# Patient Record
Sex: MALE | Race: WHITE | Employment: OTHER | URBAN - METROPOLITAN AREA
[De-identification: names, ages, dates, MRNs, and addresses within clinical notes are randomized per-mention and may not be internally consistent; named-entity substitution may affect disease eponyms.]

---

## 2018-08-23 ENCOUNTER — HOSPITAL ENCOUNTER (OUTPATIENT)
Age: 83
Discharge: HOME | End: 2018-08-25
Payer: MEDICARE

## 2018-08-23 LAB
ALBUMIN SERPL-MCNC: 4 G/DL (ref 3.5–5.2)
ALP BLD-CCNC: 105 U/L (ref 40–129)
ALT SERPL-CCNC: 21 U/L (ref 0–40)
ANION GAP SERPL CALCULATED.3IONS-SCNC: 19 MMOL/L (ref 7–16)
AST SERPL-CCNC: 31 U/L (ref 0–39)
BASOPHILS ABSOLUTE: 0.04 E9/L (ref 0–0.2)
BASOPHILS RELATIVE PERCENT: 0.5 % (ref 0–2)
BILIRUB SERPL-MCNC: 0.5 MG/DL (ref 0–1.2)
BUN BLDV-MCNC: 18 MG/DL (ref 8–23)
CALCIUM SERPL-MCNC: 9.4 MG/DL (ref 8.6–10.2)
CHLORIDE BLD-SCNC: 99 MMOL/L (ref 98–107)
CHOLESTEROL, TOTAL: 177 MG/DL (ref 0–199)
CO2: 24 MMOL/L (ref 22–29)
CREAT SERPL-MCNC: 0.7 MG/DL (ref 0.7–1.2)
EOSINOPHILS ABSOLUTE: 0.14 E9/L (ref 0.05–0.5)
EOSINOPHILS RELATIVE PERCENT: 1.9 % (ref 0–6)
GFR AFRICAN AMERICAN: >60
GFR NON-AFRICAN AMERICAN: >60 ML/MIN/1.73
GLUCOSE BLD-MCNC: 94 MG/DL (ref 74–109)
HCT VFR BLD CALC: 46.1 % (ref 37–54)
HDLC SERPL-MCNC: 70 MG/DL
HEMOGLOBIN: 14.5 G/DL (ref 12.5–16.5)
IMMATURE GRANULOCYTES #: 0.02 E9/L
IMMATURE GRANULOCYTES %: 0.3 % (ref 0–5)
LDL CHOLESTEROL CALCULATED: 96 MG/DL (ref 0–99)
LYMPHOCYTES ABSOLUTE: 1.39 E9/L (ref 1.5–4)
LYMPHOCYTES RELATIVE PERCENT: 19.1 % (ref 20–42)
MCH RBC QN AUTO: 31.3 PG (ref 26–35)
MCHC RBC AUTO-ENTMCNC: 31.5 % (ref 32–34.5)
MCV RBC AUTO: 99.6 FL (ref 80–99.9)
MONOCYTES ABSOLUTE: 0.71 E9/L (ref 0.1–0.95)
MONOCYTES RELATIVE PERCENT: 9.8 % (ref 2–12)
NEUTROPHILS ABSOLUTE: 4.98 E9/L (ref 1.8–7.3)
NEUTROPHILS RELATIVE PERCENT: 68.4 % (ref 43–80)
PDW BLD-RTO: 13.7 FL (ref 11.5–15)
PLATELET # BLD: 227 E9/L (ref 130–450)
PMV BLD AUTO: 10.4 FL (ref 7–12)
POTASSIUM SERPL-SCNC: 4.3 MMOL/L (ref 3.5–5)
RBC # BLD: 4.63 E12/L (ref 3.8–5.8)
SODIUM BLD-SCNC: 142 MMOL/L (ref 132–146)
TOTAL PROTEIN: 8.1 G/DL (ref 6.4–8.3)
TRIGL SERPL-MCNC: 54 MG/DL (ref 0–149)
VLDLC SERPL CALC-MCNC: 11 MG/DL
WBC # BLD: 7.3 E9/L (ref 4.5–11.5)

## 2018-08-23 PROCEDURE — 80061 LIPID PANEL: CPT

## 2018-08-23 PROCEDURE — 80053 COMPREHEN METABOLIC PANEL: CPT

## 2018-08-23 PROCEDURE — 85025 COMPLETE CBC W/AUTO DIFF WBC: CPT

## 2020-01-01 ENCOUNTER — HOSPITAL ENCOUNTER (OUTPATIENT)
Dept: PREADMISSION TESTING | Age: 85
Discharge: HOME | DRG: 039 | End: 2020-10-20
Payer: MEDICARE

## 2020-01-01 ENCOUNTER — OFFICE VISIT (OUTPATIENT)
Dept: VASCULAR SURGERY | Age: 85
End: 2020-01-01
Payer: MEDICARE

## 2020-01-01 ENCOUNTER — ANESTHESIA EVENT (OUTPATIENT)
Dept: OPERATING ROOM | Age: 85
DRG: 039 | End: 2020-01-01
Payer: MEDICARE

## 2020-01-01 ENCOUNTER — ANESTHESIA (OUTPATIENT)
Dept: OPERATING ROOM | Age: 85
DRG: 039 | End: 2020-01-01
Payer: MEDICARE

## 2020-01-01 ENCOUNTER — HOSPITAL ENCOUNTER (OUTPATIENT)
Dept: INTERVENTIONAL RADIOLOGY/VASCULAR | Age: 85
Discharge: HOME | End: 2020-05-13
Payer: MEDICARE

## 2020-01-01 ENCOUNTER — HOSPITAL ENCOUNTER (OUTPATIENT)
Age: 85
Discharge: HOME | End: 2020-04-23
Payer: MEDICARE

## 2020-01-01 ENCOUNTER — TELEPHONE (OUTPATIENT)
Dept: VASCULAR SURGERY | Age: 85
End: 2020-01-01

## 2020-01-01 ENCOUNTER — HOSPITAL ENCOUNTER (INPATIENT)
Age: 85
LOS: 1 days | Discharge: HOME | DRG: 039 | End: 2020-10-23
Attending: SURGERY | Admitting: SURGERY
Payer: MEDICARE

## 2020-01-01 ENCOUNTER — HOSPITAL ENCOUNTER (OUTPATIENT)
Dept: CARDIOLOGY | Age: 85
Discharge: HOME | End: 2020-11-11
Payer: MEDICARE

## 2020-01-01 ENCOUNTER — HOSPITAL ENCOUNTER (OUTPATIENT)
Dept: NON INVASIVE DIAGNOSTICS | Age: 85
Discharge: HOME | End: 2020-09-18
Payer: MEDICARE

## 2020-01-01 ENCOUNTER — HOSPITAL ENCOUNTER (OUTPATIENT)
Dept: CT IMAGING | Age: 85
Discharge: HOME | End: 2020-08-11
Payer: MEDICARE

## 2020-01-01 ENCOUNTER — HOSPITAL ENCOUNTER (OUTPATIENT)
Dept: CT IMAGING | Age: 85
Discharge: HOME | End: 2020-05-13
Payer: MEDICARE

## 2020-01-01 ENCOUNTER — HOSPITAL ENCOUNTER (OUTPATIENT)
Age: 85
Discharge: HOME | End: 2020-10-18
Payer: MEDICARE

## 2020-01-01 ENCOUNTER — HOSPITAL ENCOUNTER (OUTPATIENT)
Dept: ULTRASOUND IMAGING | Age: 85
Discharge: HOME | End: 2020-04-24
Payer: MEDICARE

## 2020-01-01 ENCOUNTER — OFFICE VISIT (OUTPATIENT)
Dept: VASCULAR SURGERY | Age: 85
End: 2020-01-01

## 2020-01-01 ENCOUNTER — HOSPITAL ENCOUNTER (OUTPATIENT)
Dept: INTERVENTIONAL RADIOLOGY/VASCULAR | Age: 85
Discharge: HOME | End: 2020-05-15
Payer: MEDICARE

## 2020-01-01 ENCOUNTER — HOSPITAL ENCOUNTER (OUTPATIENT)
Age: 85
Discharge: HOME | End: 2020-08-06
Payer: MEDICARE

## 2020-01-01 VITALS
RESPIRATION RATE: 14 BRPM | OXYGEN SATURATION: 90 % | HEART RATE: 77 BPM | DIASTOLIC BLOOD PRESSURE: 51 MMHG | TEMPERATURE: 98.4 F | WEIGHT: 178 LBS | SYSTOLIC BLOOD PRESSURE: 114 MMHG | HEIGHT: 68 IN | BODY MASS INDEX: 26.98 KG/M2

## 2020-01-01 VITALS
OXYGEN SATURATION: 94 % | TEMPERATURE: 98.1 F | SYSTOLIC BLOOD PRESSURE: 117 MMHG | RESPIRATION RATE: 12 BRPM | HEIGHT: 68 IN | WEIGHT: 170 LBS | BODY MASS INDEX: 25.76 KG/M2 | DIASTOLIC BLOOD PRESSURE: 58 MMHG | HEART RATE: 75 BPM

## 2020-01-01 VITALS
BODY MASS INDEX: 25.76 KG/M2 | HEIGHT: 68 IN | SYSTOLIC BLOOD PRESSURE: 138 MMHG | RESPIRATION RATE: 16 BRPM | DIASTOLIC BLOOD PRESSURE: 74 MMHG | WEIGHT: 170 LBS

## 2020-01-01 VITALS
WEIGHT: 170 LBS | SYSTOLIC BLOOD PRESSURE: 130 MMHG | HEIGHT: 68 IN | RESPIRATION RATE: 16 BRPM | DIASTOLIC BLOOD PRESSURE: 70 MMHG | BODY MASS INDEX: 25.76 KG/M2

## 2020-01-01 VITALS
DIASTOLIC BLOOD PRESSURE: 78 MMHG | WEIGHT: 170 LBS | HEART RATE: 78 BPM | SYSTOLIC BLOOD PRESSURE: 124 MMHG | HEIGHT: 68 IN | RESPIRATION RATE: 16 BRPM | BODY MASS INDEX: 25.76 KG/M2

## 2020-01-01 VITALS
HEIGHT: 68 IN | BODY MASS INDEX: 25.76 KG/M2 | SYSTOLIC BLOOD PRESSURE: 118 MMHG | RESPIRATION RATE: 16 BRPM | WEIGHT: 170 LBS | HEART RATE: 76 BPM | DIASTOLIC BLOOD PRESSURE: 60 MMHG

## 2020-01-01 VITALS — OXYGEN SATURATION: 98 %

## 2020-01-01 LAB
ABO/RH: NORMAL
ALBUMIN SERPL-MCNC: 4 G/DL (ref 3.5–5.2)
ALP BLD-CCNC: 106 U/L (ref 40–129)
ALT SERPL-CCNC: 16 U/L (ref 0–40)
ANION GAP SERPL CALCULATED.3IONS-SCNC: 10 MMOL/L (ref 7–16)
ANION GAP SERPL CALCULATED.3IONS-SCNC: 11 MMOL/L (ref 7–16)
ANION GAP SERPL CALCULATED.3IONS-SCNC: 11 MMOL/L (ref 7–16)
ANION GAP SERPL CALCULATED.3IONS-SCNC: 8 MMOL/L (ref 7–16)
ANTIBODY SCREEN: NORMAL
AST SERPL-CCNC: 22 U/L (ref 0–39)
BACTERIA: ABNORMAL /HPF
BASOPHILS ABSOLUTE: 0 E9/L (ref 0–0.2)
BASOPHILS ABSOLUTE: 0.02 E9/L (ref 0–0.2)
BASOPHILS ABSOLUTE: 0.05 E9/L (ref 0–0.2)
BASOPHILS RELATIVE PERCENT: 0.2 % (ref 0–2)
BASOPHILS RELATIVE PERCENT: 0.3 % (ref 0–2)
BASOPHILS RELATIVE PERCENT: 0.8 % (ref 0–2)
BILIRUB SERPL-MCNC: 0.5 MG/DL (ref 0–1.2)
BILIRUBIN URINE: ABNORMAL
BLOOD, URINE: ABNORMAL
BUN BLDV-MCNC: 17 MG/DL (ref 8–23)
BUN BLDV-MCNC: 21 MG/DL (ref 8–23)
BUN BLDV-MCNC: 22 MG/DL (ref 8–23)
BUN BLDV-MCNC: 25 MG/DL (ref 8–23)
CALCIUM SERPL-MCNC: 10.1 MG/DL (ref 8.6–10.2)
CALCIUM SERPL-MCNC: 8.3 MG/DL (ref 8.6–10.2)
CALCIUM SERPL-MCNC: 9.7 MG/DL (ref 8.6–10.2)
CALCIUM SERPL-MCNC: 9.8 MG/DL (ref 8.6–10.2)
CHLORIDE BLD-SCNC: 102 MMOL/L (ref 98–107)
CHLORIDE BLD-SCNC: 95 MMOL/L (ref 98–107)
CHLORIDE BLD-SCNC: 96 MMOL/L (ref 98–107)
CHLORIDE BLD-SCNC: 97 MMOL/L (ref 98–107)
CHOLESTEROL, TOTAL: 196 MG/DL (ref 0–199)
CLARITY: CLEAR
CO2: 24 MMOL/L (ref 22–29)
CO2: 27 MMOL/L (ref 22–29)
CO2: 29 MMOL/L (ref 22–29)
CO2: 31 MMOL/L (ref 22–29)
COLOR: YELLOW
CREAT SERPL-MCNC: 0.6 MG/DL (ref 0.7–1.2)
CREAT SERPL-MCNC: 0.8 MG/DL (ref 0.7–1.2)
CREAT SERPL-MCNC: 0.8 MG/DL (ref 0.7–1.2)
CREAT SERPL-MCNC: 1 MG/DL (ref 0.7–1.2)
CRYSTALS, UA: ABNORMAL /HPF
EOSINOPHILS ABSOLUTE: 0 E9/L (ref 0.05–0.5)
EOSINOPHILS ABSOLUTE: 0.01 E9/L (ref 0.05–0.5)
EOSINOPHILS ABSOLUTE: 0.14 E9/L (ref 0.05–0.5)
EOSINOPHILS RELATIVE PERCENT: 0.1 % (ref 0–6)
EOSINOPHILS RELATIVE PERCENT: 1.1 % (ref 0–6)
EOSINOPHILS RELATIVE PERCENT: 2.1 % (ref 0–6)
EPITHELIAL CELLS, UA: ABNORMAL /HPF
GFR AFRICAN AMERICAN: >60
GFR NON-AFRICAN AMERICAN: >60 ML/MIN/1.73
GLUCOSE BLD-MCNC: 105 MG/DL (ref 74–99)
GLUCOSE BLD-MCNC: 113 MG/DL (ref 74–99)
GLUCOSE BLD-MCNC: 93 MG/DL (ref 74–99)
GLUCOSE BLD-MCNC: 99 MG/DL (ref 74–99)
GLUCOSE URINE: NEGATIVE MG/DL
HCT VFR BLD CALC: 35.2 % (ref 37–54)
HCT VFR BLD CALC: 35.2 % (ref 37–54)
HCT VFR BLD CALC: 40.4 % (ref 37–54)
HCT VFR BLD CALC: 45.5 % (ref 37–54)
HDLC SERPL-MCNC: 75 MG/DL
HEMOGLOBIN: 11.3 G/DL (ref 12.5–16.5)
HEMOGLOBIN: 11.4 G/DL (ref 12.5–16.5)
HEMOGLOBIN: 12.8 G/DL (ref 12.5–16.5)
HEMOGLOBIN: 14 G/DL (ref 12.5–16.5)
HYALINE CASTS: ABNORMAL /LPF (ref 0–2)
IMMATURE GRANULOCYTES #: 0.02 E9/L
IMMATURE GRANULOCYTES #: 0.03 E9/L
IMMATURE GRANULOCYTES %: 0.3 % (ref 0–5)
IMMATURE GRANULOCYTES %: 0.4 % (ref 0–5)
INR BLD: 1.1
KETONES, URINE: ABNORMAL MG/DL
LDL CHOLESTEROL CALCULATED: 103 MG/DL (ref 0–99)
LEUKOCYTE ESTERASE, URINE: NEGATIVE
LV EF: 51 %
LVEF MODALITY: NORMAL
LYMPHOCYTES ABSOLUTE: 0.75 E9/L (ref 1.5–4)
LYMPHOCYTES ABSOLUTE: 1 E9/L (ref 1.5–4)
LYMPHOCYTES ABSOLUTE: 1.26 E9/L (ref 1.5–4)
LYMPHOCYTES RELATIVE PERCENT: 12.3 % (ref 20–42)
LYMPHOCYTES RELATIVE PERCENT: 19 % (ref 20–42)
LYMPHOCYTES RELATIVE PERCENT: 9.6 % (ref 20–42)
MCH RBC QN AUTO: 30.2 PG (ref 26–35)
MCH RBC QN AUTO: 31 PG (ref 26–35)
MCH RBC QN AUTO: 31.3 PG (ref 26–35)
MCH RBC QN AUTO: 31.7 PG (ref 26–35)
MCHC RBC AUTO-ENTMCNC: 30.8 % (ref 32–34.5)
MCHC RBC AUTO-ENTMCNC: 31.7 % (ref 32–34.5)
MCHC RBC AUTO-ENTMCNC: 32.1 % (ref 32–34.5)
MCHC RBC AUTO-ENTMCNC: 32.4 % (ref 32–34.5)
MCV RBC AUTO: 96.7 FL (ref 80–99.9)
MCV RBC AUTO: 97.8 FL (ref 80–99.9)
MCV RBC AUTO: 98.3 FL (ref 80–99.9)
MCV RBC AUTO: 98.9 FL (ref 80–99.9)
MONOCYTES ABSOLUTE: 0 E9/L (ref 0.1–0.95)
MONOCYTES ABSOLUTE: 0.62 E9/L (ref 0.1–0.95)
MONOCYTES ABSOLUTE: 0.88 E9/L (ref 0.1–0.95)
MONOCYTES RELATIVE PERCENT: 10.9 % (ref 2–12)
MONOCYTES RELATIVE PERCENT: 2.4 % (ref 2–12)
MONOCYTES RELATIVE PERCENT: 9.4 % (ref 2–12)
MRSA CULTURE ONLY: NORMAL
NEUTROPHILS ABSOLUTE: 4.53 E9/L (ref 1.8–7.3)
NEUTROPHILS ABSOLUTE: 6.16 E9/L (ref 1.8–7.3)
NEUTROPHILS ABSOLUTE: 6.75 E9/L (ref 1.8–7.3)
NEUTROPHILS RELATIVE PERCENT: 68.4 % (ref 43–80)
NEUTROPHILS RELATIVE PERCENT: 76.1 % (ref 43–80)
NEUTROPHILS RELATIVE PERCENT: 90.4 % (ref 43–80)
NITRITE, URINE: POSITIVE
PDW BLD-RTO: 13.4 FL (ref 11.5–15)
PDW BLD-RTO: 13.7 FL (ref 11.5–15)
PDW BLD-RTO: 13.7 FL (ref 11.5–15)
PDW BLD-RTO: 14.5 FL (ref 11.5–15)
PH UA: 5.5 (ref 5–9)
PLATELET # BLD: 162 E9/L (ref 130–450)
PLATELET # BLD: 171 E9/L (ref 130–450)
PLATELET # BLD: 212 E9/L (ref 130–450)
PLATELET # BLD: 237 E9/L (ref 130–450)
PMV BLD AUTO: 10.2 FL (ref 7–12)
PMV BLD AUTO: 9.5 FL (ref 7–12)
PMV BLD AUTO: 9.5 FL (ref 7–12)
PMV BLD AUTO: 9.7 FL (ref 7–12)
POC ACT LR: 161 SECONDS
POC ACT LR: 167 SECONDS
POC ACT LR: 282 SECONDS
POC ACT LR: 294 SECONDS
POTASSIUM REFLEX MAGNESIUM: 4.3 MMOL/L (ref 3.5–5)
POTASSIUM REFLEX MAGNESIUM: 4.4 MMOL/L (ref 3.5–5)
POTASSIUM SERPL-SCNC: 3.8 MMOL/L (ref 3.5–5)
POTASSIUM SERPL-SCNC: 4.2 MMOL/L (ref 3.5–5)
PROTEIN UA: ABNORMAL MG/DL
PROTHROMBIN TIME: 12.8 SEC (ref 9.3–12.4)
RBC # BLD: 3.56 E12/L (ref 3.8–5.8)
RBC # BLD: 3.64 E12/L (ref 3.8–5.8)
RBC # BLD: 4.13 E12/L (ref 3.8–5.8)
RBC # BLD: 4.63 E12/L (ref 3.8–5.8)
RBC # BLD: NORMAL 10*6/UL
RBC UA: ABNORMAL /HPF (ref 0–2)
SARS-COV-2: NOT DETECTED
SODIUM BLD-SCNC: 133 MMOL/L (ref 132–146)
SODIUM BLD-SCNC: 134 MMOL/L (ref 132–146)
SODIUM BLD-SCNC: 137 MMOL/L (ref 132–146)
SODIUM BLD-SCNC: 137 MMOL/L (ref 132–146)
SOURCE: NORMAL
SPECIFIC GRAVITY UA: >=1.03 (ref 1–1.03)
TOTAL PROTEIN: 8.1 G/DL (ref 6.4–8.3)
TRIGL SERPL-MCNC: 88 MG/DL (ref 0–149)
TSH SERPL DL<=0.05 MIU/L-ACNC: 3.57 UIU/ML (ref 0.27–4.2)
UROBILINOGEN, URINE: 1 E.U./DL
VLDLC SERPL CALC-MCNC: 18 MG/DL
WBC # BLD: 6.6 E9/L (ref 4.5–11.5)
WBC # BLD: 7 E9/L (ref 4.5–11.5)
WBC # BLD: 7.5 E9/L (ref 4.5–11.5)
WBC # BLD: 8.1 E9/L (ref 4.5–11.5)
WBC UA: ABNORMAL /HPF (ref 0–5)

## 2020-01-01 PROCEDURE — 6360000002 HC RX W HCPCS: Performed by: SURGERY

## 2020-01-01 PROCEDURE — 6370000000 HC RX 637 (ALT 250 FOR IP): Performed by: NURSE PRACTITIONER

## 2020-01-01 PROCEDURE — 6360000002 HC RX W HCPCS: Performed by: ANESTHESIOLOGY

## 2020-01-01 PROCEDURE — 85610 PROTHROMBIN TIME: CPT

## 2020-01-01 PROCEDURE — U0003 INFECTIOUS AGENT DETECTION BY NUCLEIC ACID (DNA OR RNA); SEVERE ACUTE RESPIRATORY SYNDROME CORONAVIRUS 2 (SARS-COV-2) (CORONAVIRUS DISEASE [COVID-19]), AMPLIFIED PROBE TECHNIQUE, MAKING USE OF HIGH THROUGHPUT TECHNOLOGIES AS DESCRIBED BY CMS-2020-01-R: HCPCS

## 2020-01-01 PROCEDURE — 51702 INSERT TEMP BLADDER CATH: CPT

## 2020-01-01 PROCEDURE — 86850 RBC ANTIBODY SCREEN: CPT

## 2020-01-01 PROCEDURE — 36415 COLL VENOUS BLD VENIPUNCTURE: CPT

## 2020-01-01 PROCEDURE — 6360000004 HC RX CONTRAST MEDICATION: Performed by: RADIOLOGY

## 2020-01-01 PROCEDURE — 86901 BLOOD TYPING SEROLOGIC RH(D): CPT

## 2020-01-01 PROCEDURE — 93978 VASCULAR STUDY: CPT

## 2020-01-01 PROCEDURE — 93880 EXTRACRANIAL BILAT STUDY: CPT

## 2020-01-01 PROCEDURE — 80048 BASIC METABOLIC PNL TOTAL CA: CPT

## 2020-01-01 PROCEDURE — 3600000015 HC SURGERY LEVEL 5 ADDTL 15MIN: Performed by: SURGERY

## 2020-01-01 PROCEDURE — 87081 CULTURE SCREEN ONLY: CPT

## 2020-01-01 PROCEDURE — 2709999900 HC NON-CHARGEABLE SUPPLY: Performed by: SURGERY

## 2020-01-01 PROCEDURE — 3700000001 HC ADD 15 MINUTES (ANESTHESIA): Performed by: SURGERY

## 2020-01-01 PROCEDURE — 99204 OFFICE O/P NEW MOD 45 MIN: CPT | Performed by: SURGERY

## 2020-01-01 PROCEDURE — 03CL0ZZ EXTIRPATION OF MATTER FROM LEFT INTERNAL CAROTID ARTERY, OPEN APPROACH: ICD-10-PCS | Performed by: SURGERY

## 2020-01-01 PROCEDURE — 76775 US EXAM ABDO BACK WALL LIM: CPT

## 2020-01-01 PROCEDURE — 70498 CT ANGIOGRAPHY NECK: CPT

## 2020-01-01 PROCEDURE — 84443 ASSAY THYROID STIM HORMONE: CPT

## 2020-01-01 PROCEDURE — 03UJ0KZ SUPPLEMENT LEFT COMMON CAROTID ARTERY WITH NONAUTOLOGOUS TISSUE SUBSTITUTE, OPEN APPROACH: ICD-10-PCS | Performed by: SURGERY

## 2020-01-01 PROCEDURE — 7100000001 HC PACU RECOVERY - ADDTL 15 MIN: Performed by: SURGERY

## 2020-01-01 PROCEDURE — 74174 CTA ABD&PLVS W/CONTRAST: CPT

## 2020-01-01 PROCEDURE — 88304 TISSUE EXAM BY PATHOLOGIST: CPT

## 2020-01-01 PROCEDURE — 80053 COMPREHEN METABOLIC PANEL: CPT

## 2020-01-01 PROCEDURE — 2580000003 HC RX 258: Performed by: NURSE PRACTITIONER

## 2020-01-01 PROCEDURE — 93922 UPR/L XTREMITY ART 2 LEVELS: CPT

## 2020-01-01 PROCEDURE — 93306 TTE W/DOPPLER COMPLETE: CPT

## 2020-01-01 PROCEDURE — 85025 COMPLETE CBC W/AUTO DIFF WBC: CPT

## 2020-01-01 PROCEDURE — 3700000000 HC ANESTHESIA ATTENDED CARE: Performed by: SURGERY

## 2020-01-01 PROCEDURE — 85027 COMPLETE CBC AUTOMATED: CPT

## 2020-01-01 PROCEDURE — 3600000005 HC SURGERY LEVEL 5 BASE: Performed by: SURGERY

## 2020-01-01 PROCEDURE — 2000000000 HC ICU R&B

## 2020-01-01 PROCEDURE — C1768 GRAFT, VASCULAR: HCPCS | Performed by: SURGERY

## 2020-01-01 PROCEDURE — 81001 URINALYSIS AUTO W/SCOPE: CPT

## 2020-01-01 PROCEDURE — 7100000000 HC PACU RECOVERY - FIRST 15 MIN: Performed by: SURGERY

## 2020-01-01 PROCEDURE — 80061 LIPID PANEL: CPT

## 2020-01-01 PROCEDURE — 6360000002 HC RX W HCPCS: Performed by: NURSE PRACTITIONER

## 2020-01-01 PROCEDURE — 03UL0KZ SUPPLEMENT LEFT INTERNAL CAROTID ARTERY WITH NONAUTOLOGOUS TISSUE SUBSTITUTE, OPEN APPROACH: ICD-10-PCS | Performed by: SURGERY

## 2020-01-01 PROCEDURE — 6360000002 HC RX W HCPCS

## 2020-01-01 PROCEDURE — 2500000003 HC RX 250 WO HCPCS

## 2020-01-01 PROCEDURE — 2580000003 HC RX 258: Performed by: SURGERY

## 2020-01-01 PROCEDURE — 2500000003 HC RX 250 WO HCPCS: Performed by: SURGERY

## 2020-01-01 PROCEDURE — 86900 BLOOD TYPING SEROLOGIC ABO: CPT

## 2020-01-01 PROCEDURE — 6370000000 HC RX 637 (ALT 250 FOR IP): Performed by: SURGERY

## 2020-01-01 PROCEDURE — 03CJ0ZZ EXTIRPATION OF MATTER FROM LEFT COMMON CAROTID ARTERY, OPEN APPROACH: ICD-10-PCS | Performed by: SURGERY

## 2020-01-01 PROCEDURE — 88311 DECALCIFY TISSUE: CPT

## 2020-01-01 PROCEDURE — 99213 OFFICE O/P EST LOW 20 MIN: CPT | Performed by: SURGERY

## 2020-01-01 PROCEDURE — 35301 RECHANNELING OF ARTERY: CPT | Performed by: SURGERY

## 2020-01-01 PROCEDURE — 2580000003 HC RX 258

## 2020-01-01 PROCEDURE — 99024 POSTOP FOLLOW-UP VISIT: CPT | Performed by: SURGERY

## 2020-01-01 PROCEDURE — 85347 COAGULATION TIME ACTIVATED: CPT

## 2020-01-01 DEVICE — XENOSURE BIOLOGIC PATCH, 0.8CM X 8CM, EIFU
Type: IMPLANTABLE DEVICE | Site: CAROTID | Status: FUNCTIONAL
Brand: XENOSURE BIOLOGIC PATCH

## 2020-01-01 RX ORDER — OXYCODONE HYDROCHLORIDE AND ACETAMINOPHEN 5; 325 MG/1; MG/1
1 TABLET ORAL
Status: DISCONTINUED | OUTPATIENT
Start: 2020-01-01 | End: 2020-01-01

## 2020-01-01 RX ORDER — OXYCODONE HYDROCHLORIDE AND ACETAMINOPHEN 5; 325 MG/1; MG/1
2 TABLET ORAL EVERY 4 HOURS PRN
Status: DISCONTINUED | OUTPATIENT
Start: 2020-01-01 | End: 2020-01-01 | Stop reason: HOSPADM

## 2020-01-01 RX ORDER — LIDOCAINE HYDROCHLORIDE 20 MG/ML
INJECTION, SOLUTION INTRAVENOUS PRN
Status: DISCONTINUED | OUTPATIENT
Start: 2020-01-01 | End: 2020-01-01 | Stop reason: SDUPTHER

## 2020-01-01 RX ORDER — FENTANYL CITRATE 50 UG/ML
50 INJECTION, SOLUTION INTRAMUSCULAR; INTRAVENOUS EVERY 5 MIN PRN
Status: DISCONTINUED | OUTPATIENT
Start: 2020-01-01 | End: 2020-01-01

## 2020-01-01 RX ORDER — TAMSULOSIN HYDROCHLORIDE 0.4 MG/1
0.4 CAPSULE ORAL NIGHTLY
Status: DISCONTINUED | OUTPATIENT
Start: 2020-01-01 | End: 2020-01-01 | Stop reason: HOSPADM

## 2020-01-01 RX ORDER — CLOPIDOGREL BISULFATE 75 MG/1
75 TABLET ORAL DAILY
COMMUNITY

## 2020-01-01 RX ORDER — ASPIRIN 81 MG/1
81 TABLET ORAL DAILY
COMMUNITY

## 2020-01-01 RX ORDER — MEPERIDINE HYDROCHLORIDE 25 MG/ML
12.5 INJECTION INTRAMUSCULAR; INTRAVENOUS; SUBCUTANEOUS EVERY 5 MIN PRN
Status: DISCONTINUED | OUTPATIENT
Start: 2020-01-01 | End: 2020-01-01

## 2020-01-01 RX ORDER — LANOLIN ALCOHOL/MO/W.PET/CERES
50 CREAM (GRAM) TOPICAL DAILY
COMMUNITY

## 2020-01-01 RX ORDER — OXYCODONE HYDROCHLORIDE AND ACETAMINOPHEN 5; 325 MG/1; MG/1
1 TABLET ORAL EVERY 4 HOURS PRN
Status: DISCONTINUED | OUTPATIENT
Start: 2020-01-01 | End: 2020-01-01 | Stop reason: HOSPADM

## 2020-01-01 RX ORDER — SODIUM CHLORIDE 0.9 % (FLUSH) 0.9 %
10 SYRINGE (ML) INJECTION PRN
Status: DISCONTINUED | OUTPATIENT
Start: 2020-01-01 | End: 2020-01-01

## 2020-01-01 RX ORDER — METOPROLOL SUCCINATE 50 MG/1
50 TABLET, EXTENDED RELEASE ORAL DAILY
Status: DISCONTINUED | OUTPATIENT
Start: 2020-01-01 | End: 2020-01-01 | Stop reason: HOSPADM

## 2020-01-01 RX ORDER — METOPROLOL SUCCINATE 50 MG/1
50 TABLET, EXTENDED RELEASE ORAL DAILY
COMMUNITY

## 2020-01-01 RX ORDER — VECURONIUM BROMIDE 1 MG/ML
INJECTION, POWDER, LYOPHILIZED, FOR SOLUTION INTRAVENOUS PRN
Status: DISCONTINUED | OUTPATIENT
Start: 2020-01-01 | End: 2020-01-01 | Stop reason: SDUPTHER

## 2020-01-01 RX ORDER — MORPHINE SULFATE 2 MG/ML
2 INJECTION, SOLUTION INTRAMUSCULAR; INTRAVENOUS
Status: DISCONTINUED | OUTPATIENT
Start: 2020-01-01 | End: 2020-01-01 | Stop reason: HOSPADM

## 2020-01-01 RX ORDER — SODIUM CHLORIDE 9 MG/ML
INJECTION, SOLUTION INTRAVENOUS CONTINUOUS
Status: DISCONTINUED | OUTPATIENT
Start: 2020-01-01 | End: 2020-01-01 | Stop reason: HOSPADM

## 2020-01-01 RX ORDER — ACETAMINOPHEN 160 MG
2000 TABLET,DISINTEGRATING ORAL DAILY
COMMUNITY

## 2020-01-01 RX ORDER — HEPARIN SODIUM 1000 [USP'U]/ML
INJECTION, SOLUTION INTRAVENOUS; SUBCUTANEOUS PRN
Status: DISCONTINUED | OUTPATIENT
Start: 2020-01-01 | End: 2020-01-01 | Stop reason: SDUPTHER

## 2020-01-01 RX ORDER — ATORVASTATIN CALCIUM 20 MG/1
20 TABLET, FILM COATED ORAL DAILY
COMMUNITY

## 2020-01-01 RX ORDER — LISINOPRIL 5 MG/1
5 TABLET ORAL DAILY
COMMUNITY

## 2020-01-01 RX ORDER — ONDANSETRON 2 MG/ML
INJECTION INTRAMUSCULAR; INTRAVENOUS PRN
Status: DISCONTINUED | OUTPATIENT
Start: 2020-01-01 | End: 2020-01-01 | Stop reason: SDUPTHER

## 2020-01-01 RX ORDER — OXYBUTYNIN CHLORIDE 5 MG/1
5 TABLET, EXTENDED RELEASE ORAL DAILY
COMMUNITY

## 2020-01-01 RX ORDER — MORPHINE SULFATE 4 MG/ML
4 INJECTION, SOLUTION INTRAMUSCULAR; INTRAVENOUS
Status: DISCONTINUED | OUTPATIENT
Start: 2020-01-01 | End: 2020-01-01 | Stop reason: HOSPADM

## 2020-01-01 RX ORDER — ASPIRIN 81 MG/1
81 TABLET ORAL DAILY
Status: DISCONTINUED | OUTPATIENT
Start: 2020-01-01 | End: 2020-01-01 | Stop reason: HOSPADM

## 2020-01-01 RX ORDER — SODIUM CHLORIDE 0.9 % (FLUSH) 0.9 %
10 SYRINGE (ML) INJECTION PRN
Status: DISCONTINUED | OUTPATIENT
Start: 2020-01-01 | End: 2020-01-01 | Stop reason: HOSPADM

## 2020-01-01 RX ORDER — OXYCODONE HYDROCHLORIDE AND ACETAMINOPHEN 5; 325 MG/1; MG/1
1 TABLET ORAL EVERY 6 HOURS PRN
Qty: 20 TABLET | Refills: 0 | Status: SHIPPED | OUTPATIENT
Start: 2020-01-01 | End: 2020-01-01

## 2020-01-01 RX ORDER — SODIUM CHLORIDE 0.9 % (FLUSH) 0.9 %
10 SYRINGE (ML) INJECTION EVERY 12 HOURS SCHEDULED
Status: DISCONTINUED | OUTPATIENT
Start: 2020-01-01 | End: 2020-01-01 | Stop reason: HOSPADM

## 2020-01-01 RX ORDER — LIDOCAINE HYDROCHLORIDE ANHYDROUS AND DEXTROSE MONOHYDRATE .4; 5 G/100ML; G/100ML
INJECTION, SOLUTION INTRAVENOUS CONTINUOUS PRN
Status: DISCONTINUED | OUTPATIENT
Start: 2020-01-01 | End: 2020-01-01 | Stop reason: SDUPTHER

## 2020-01-01 RX ORDER — TIMOLOL MALEATE 5 MG/ML
1 SOLUTION/ DROPS OPHTHALMIC DAILY
Status: DISCONTINUED | OUTPATIENT
Start: 2020-01-01 | End: 2020-01-01 | Stop reason: HOSPADM

## 2020-01-01 RX ORDER — OXYBUTYNIN CHLORIDE 5 MG/1
5 TABLET, EXTENDED RELEASE ORAL DAILY
Status: DISCONTINUED | OUTPATIENT
Start: 2020-01-01 | End: 2020-01-01 | Stop reason: HOSPADM

## 2020-01-01 RX ORDER — SODIUM CHLORIDE 0.9 % (FLUSH) 0.9 %
10 SYRINGE (ML) INJECTION EVERY 12 HOURS SCHEDULED
Status: DISCONTINUED | OUTPATIENT
Start: 2020-01-01 | End: 2020-01-01

## 2020-01-01 RX ORDER — PROPOFOL 10 MG/ML
INJECTION, EMULSION INTRAVENOUS CONTINUOUS PRN
Status: DISCONTINUED | OUTPATIENT
Start: 2020-01-01 | End: 2020-01-01 | Stop reason: SDUPTHER

## 2020-01-01 RX ORDER — LISINOPRIL 2.5 MG/1
5 TABLET ORAL DAILY
COMMUNITY
End: 2020-01-01 | Stop reason: ALTCHOICE

## 2020-01-01 RX ORDER — DEXAMETHASONE SODIUM PHOSPHATE 10 MG/ML
INJECTION, SOLUTION INTRAMUSCULAR; INTRAVENOUS PRN
Status: DISCONTINUED | OUTPATIENT
Start: 2020-01-01 | End: 2020-01-01 | Stop reason: SDUPTHER

## 2020-01-01 RX ORDER — ACETAMINOPHEN 325 MG/1
650 TABLET ORAL EVERY 4 HOURS PRN
Status: DISCONTINUED | OUTPATIENT
Start: 2020-01-01 | End: 2020-01-01 | Stop reason: HOSPADM

## 2020-01-01 RX ORDER — ATORVASTATIN CALCIUM 20 MG/1
20 TABLET, FILM COATED ORAL DAILY
Status: DISCONTINUED | OUTPATIENT
Start: 2020-01-01 | End: 2020-01-01 | Stop reason: HOSPADM

## 2020-01-01 RX ORDER — FENTANYL CITRATE 50 UG/ML
INJECTION, SOLUTION INTRAMUSCULAR; INTRAVENOUS PRN
Status: DISCONTINUED | OUTPATIENT
Start: 2020-01-01 | End: 2020-01-01 | Stop reason: SDUPTHER

## 2020-01-01 RX ORDER — AMLODIPINE BESYLATE 5 MG/1
5 TABLET ORAL DAILY
COMMUNITY

## 2020-01-01 RX ORDER — DIPHENHYDRAMINE HYDROCHLORIDE 50 MG/ML
12.5 INJECTION INTRAMUSCULAR; INTRAVENOUS
Status: DISCONTINUED | OUTPATIENT
Start: 2020-01-01 | End: 2020-01-01

## 2020-01-01 RX ORDER — PROPOFOL 10 MG/ML
INJECTION, EMULSION INTRAVENOUS PRN
Status: DISCONTINUED | OUTPATIENT
Start: 2020-01-01 | End: 2020-01-01 | Stop reason: SDUPTHER

## 2020-01-01 RX ORDER — PHENYLEPHRINE HYDROCHLORIDE 10 MG/ML
INJECTION INTRAVENOUS PRN
Status: DISCONTINUED | OUTPATIENT
Start: 2020-01-01 | End: 2020-01-01 | Stop reason: SDUPTHER

## 2020-01-01 RX ORDER — GLYCOPYRROLATE 1 MG/5 ML
SYRINGE (ML) INTRAVENOUS PRN
Status: DISCONTINUED | OUTPATIENT
Start: 2020-01-01 | End: 2020-01-01 | Stop reason: SDUPTHER

## 2020-01-01 RX ORDER — LORAZEPAM 1 MG/1
1 TABLET ORAL NIGHTLY
Status: DISCONTINUED | OUTPATIENT
Start: 2020-01-01 | End: 2020-01-01 | Stop reason: HOSPADM

## 2020-01-01 RX ORDER — SODIUM CHLORIDE 9 MG/ML
INJECTION, SOLUTION INTRAVENOUS CONTINUOUS PRN
Status: DISCONTINUED | OUTPATIENT
Start: 2020-01-01 | End: 2020-01-01 | Stop reason: SDUPTHER

## 2020-01-01 RX ORDER — FENTANYL CITRATE 50 UG/ML
25 INJECTION, SOLUTION INTRAMUSCULAR; INTRAVENOUS EVERY 5 MIN PRN
Status: DISCONTINUED | OUTPATIENT
Start: 2020-01-01 | End: 2020-01-01

## 2020-01-01 RX ORDER — CLOPIDOGREL BISULFATE 75 MG/1
75 TABLET ORAL DAILY
Status: DISCONTINUED | OUTPATIENT
Start: 2020-01-01 | End: 2020-01-01 | Stop reason: HOSPADM

## 2020-01-01 RX ORDER — LISINOPRIL 5 MG/1
5 TABLET ORAL DAILY
Status: DISCONTINUED | OUTPATIENT
Start: 2020-01-01 | End: 2020-01-01 | Stop reason: HOSPADM

## 2020-01-01 RX ORDER — PROTAMINE SULFATE 10 MG/ML
INJECTION, SOLUTION INTRAVENOUS PRN
Status: DISCONTINUED | OUTPATIENT
Start: 2020-01-01 | End: 2020-01-01 | Stop reason: SDUPTHER

## 2020-01-01 RX ORDER — PROMETHAZINE HYDROCHLORIDE 25 MG/ML
6.25 INJECTION, SOLUTION INTRAMUSCULAR; INTRAVENOUS
Status: DISCONTINUED | OUTPATIENT
Start: 2020-01-01 | End: 2020-01-01

## 2020-01-01 RX ORDER — SODIUM CHLORIDE 9 MG/ML
INJECTION, SOLUTION INTRAVENOUS CONTINUOUS
Status: DISCONTINUED | OUTPATIENT
Start: 2020-01-01 | End: 2020-01-01

## 2020-01-01 RX ORDER — AMLODIPINE BESYLATE 5 MG/1
5 TABLET ORAL DAILY
Status: DISCONTINUED | OUTPATIENT
Start: 2020-01-01 | End: 2020-01-01 | Stop reason: HOSPADM

## 2020-01-01 RX ORDER — NEOSTIGMINE METHYLSULFATE 1 MG/ML
INJECTION, SOLUTION INTRAVENOUS PRN
Status: DISCONTINUED | OUTPATIENT
Start: 2020-01-01 | End: 2020-01-01 | Stop reason: SDUPTHER

## 2020-01-01 RX ORDER — LORAZEPAM 1 MG/1
1 TABLET ORAL NIGHTLY
COMMUNITY

## 2020-01-01 RX ORDER — SODIUM CHLORIDE 450 MG/100ML
INJECTION, SOLUTION INTRAVENOUS CONTINUOUS
Status: DISCONTINUED | OUTPATIENT
Start: 2020-01-01 | End: 2020-01-01

## 2020-01-01 RX ADMIN — VECURONIUM BROMIDE 7 MG: 10 INJECTION, POWDER, LYOPHILIZED, FOR SOLUTION INTRAVENOUS at 08:00

## 2020-01-01 RX ADMIN — PROPOFOL 40 MG: 10 INJECTION, EMULSION INTRAVENOUS at 09:22

## 2020-01-01 RX ADMIN — AMLODIPINE BESYLATE 5 MG: 5 TABLET ORAL at 10:04

## 2020-01-01 RX ADMIN — IOPAMIDOL 75 ML: 755 INJECTION, SOLUTION INTRAVENOUS at 08:25

## 2020-01-01 RX ADMIN — DEXAMETHASONE SODIUM PHOSPHATE 10 MG: 10 INJECTION, SOLUTION INTRAMUSCULAR; INTRAVENOUS at 08:08

## 2020-01-01 RX ADMIN — FENTANYL CITRATE 50 MCG: 50 INJECTION, SOLUTION INTRAMUSCULAR; INTRAVENOUS at 11:41

## 2020-01-01 RX ADMIN — TAMSULOSIN HYDROCHLORIDE 0.4 MG: 0.4 CAPSULE ORAL at 10:04

## 2020-01-01 RX ADMIN — OXYBUTYNIN CHLORIDE 5 MG: 5 TABLET, EXTENDED RELEASE ORAL at 12:02

## 2020-01-01 RX ADMIN — OXYCODONE AND ACETAMINOPHEN 1 TABLET: 5; 325 TABLET ORAL at 21:57

## 2020-01-01 RX ADMIN — LIDOCAINE HYDROCHLORIDE 40 MG: 20 INJECTION, SOLUTION INTRAVENOUS at 07:58

## 2020-01-01 RX ADMIN — ENOXAPARIN SODIUM 40 MG: 40 INJECTION SUBCUTANEOUS at 17:31

## 2020-01-01 RX ADMIN — Medication 2 G: at 07:55

## 2020-01-01 RX ADMIN — SODIUM CHLORIDE, PRESERVATIVE FREE 10 ML: 5 INJECTION INTRAVENOUS at 20:17

## 2020-01-01 RX ADMIN — ONDANSETRON HYDROCHLORIDE 4 MG: 2 INJECTION, SOLUTION INTRAMUSCULAR; INTRAVENOUS at 10:00

## 2020-01-01 RX ADMIN — LIDOCAINE HYDROCHLORIDE 40 MG: 20 INJECTION, SOLUTION INTRAVENOUS at 10:17

## 2020-01-01 RX ADMIN — LIDOCAINE HYDROCHLORIDE 2 MG/MIN: 4 INJECTION, SOLUTION INTRAVENOUS at 08:08

## 2020-01-01 RX ADMIN — FENTANYL CITRATE 50 MCG: 50 INJECTION, SOLUTION INTRAMUSCULAR; INTRAVENOUS at 11:13

## 2020-01-01 RX ADMIN — SODIUM CHLORIDE: 9 INJECTION, SOLUTION INTRAVENOUS at 08:00

## 2020-01-01 RX ADMIN — HEPARIN SODIUM 7000 UNITS: 1000 INJECTION INTRAVENOUS; SUBCUTANEOUS at 09:03

## 2020-01-01 RX ADMIN — PROPOFOL 20 MG: 10 INJECTION, EMULSION INTRAVENOUS at 10:17

## 2020-01-01 RX ADMIN — PROPOFOL 100 MCG/KG/MIN: 10 INJECTION, EMULSION INTRAVENOUS at 08:08

## 2020-01-01 RX ADMIN — PROPOFOL 130 MG: 10 INJECTION, EMULSION INTRAVENOUS at 07:58

## 2020-01-01 RX ADMIN — SODIUM CHLORIDE: 9 INJECTION, SOLUTION INTRAVENOUS at 06:05

## 2020-01-01 RX ADMIN — HEPARIN SODIUM 3000 UNITS: 1000 INJECTION INTRAVENOUS; SUBCUTANEOUS at 09:08

## 2020-01-01 RX ADMIN — METOPROLOL SUCCINATE 50 MG: 50 TABLET, EXTENDED RELEASE ORAL at 10:04

## 2020-01-01 RX ADMIN — PROTAMINE SULFATE 40 MG: 10 INJECTION, SOLUTION INTRAVENOUS at 09:47

## 2020-01-01 RX ADMIN — FENTANYL CITRATE 50 MCG: 50 INJECTION, SOLUTION INTRAMUSCULAR; INTRAVENOUS at 07:58

## 2020-01-01 RX ADMIN — PROPOFOL 30 MG: 10 INJECTION, EMULSION INTRAVENOUS at 09:44

## 2020-01-01 RX ADMIN — PHENYLEPHRINE HYDROCHLORIDE 100 MCG: 10 INJECTION INTRAVENOUS at 09:07

## 2020-01-01 RX ADMIN — PHENYLEPHRINE HYDROCHLORIDE 100 MCG: 10 INJECTION INTRAVENOUS at 08:14

## 2020-01-01 RX ADMIN — HYDROMORPHONE HYDROCHLORIDE 0.5 MG: 1 INJECTION, SOLUTION INTRAMUSCULAR; INTRAVENOUS; SUBCUTANEOUS at 13:31

## 2020-01-01 RX ADMIN — SODIUM CHLORIDE: 9 INJECTION, SOLUTION INTRAVENOUS at 06:56

## 2020-01-01 RX ADMIN — OXYCODONE AND ACETAMINOPHEN 1 TABLET: 5; 325 TABLET ORAL at 17:41

## 2020-01-01 RX ADMIN — Medication 0.6 MG: at 10:13

## 2020-01-01 RX ADMIN — SODIUM CHLORIDE: 9 INJECTION, SOLUTION INTRAVENOUS at 21:51

## 2020-01-01 RX ADMIN — LIDOCAINE HYDROCHLORIDE 20 MG: 20 INJECTION, SOLUTION INTRAVENOUS at 09:46

## 2020-01-01 RX ADMIN — FENTANYL CITRATE 50 MCG: 50 INJECTION, SOLUTION INTRAMUSCULAR; INTRAVENOUS at 08:45

## 2020-01-01 RX ADMIN — TIMOLOL MALEATE 1 DROP: 5 SOLUTION/ DROPS OPHTHALMIC at 06:47

## 2020-01-01 RX ADMIN — Medication 3 MG: at 10:13

## 2020-01-01 RX ADMIN — ASPIRIN 81 MG: 81 TABLET, COATED ORAL at 12:02

## 2020-01-01 RX ADMIN — IOPAMIDOL 75 ML: 755 INJECTION, SOLUTION INTRAVENOUS at 09:28

## 2020-01-01 RX ADMIN — CLOPIDOGREL BISULFATE 75 MG: 75 TABLET ORAL at 10:05

## 2020-01-01 ASSESSMENT — PULMONARY FUNCTION TESTS
PIF_VALUE: 19
PIF_VALUE: 18
PIF_VALUE: 19
PIF_VALUE: 20
PIF_VALUE: 19
PIF_VALUE: 1
PIF_VALUE: 21
PIF_VALUE: 20
PIF_VALUE: 3
PIF_VALUE: 1
PIF_VALUE: 20
PIF_VALUE: 18
PIF_VALUE: 20
PIF_VALUE: 18
PIF_VALUE: 21
PIF_VALUE: 20
PIF_VALUE: 21
PIF_VALUE: 20
PIF_VALUE: 19
PIF_VALUE: 18
PIF_VALUE: 1
PIF_VALUE: 21
PIF_VALUE: 18
PIF_VALUE: 21
PIF_VALUE: 19
PIF_VALUE: 21
PIF_VALUE: 20
PIF_VALUE: 21
PIF_VALUE: 21
PIF_VALUE: 0
PIF_VALUE: 21
PIF_VALUE: 18
PIF_VALUE: 18
PIF_VALUE: 4
PIF_VALUE: 19
PIF_VALUE: 20
PIF_VALUE: 14
PIF_VALUE: 21
PIF_VALUE: 20
PIF_VALUE: 19
PIF_VALUE: 20
PIF_VALUE: 18
PIF_VALUE: 21
PIF_VALUE: 20
PIF_VALUE: 19
PIF_VALUE: 33
PIF_VALUE: 18
PIF_VALUE: 20
PIF_VALUE: 0
PIF_VALUE: 21
PIF_VALUE: 20
PIF_VALUE: 18
PIF_VALUE: 21
PIF_VALUE: 20
PIF_VALUE: 18
PIF_VALUE: 19
PIF_VALUE: 14
PIF_VALUE: 18
PIF_VALUE: 19
PIF_VALUE: 20
PIF_VALUE: 19
PIF_VALUE: 21
PIF_VALUE: 19
PIF_VALUE: 19
PIF_VALUE: 21
PIF_VALUE: 18
PIF_VALUE: 4
PIF_VALUE: 14
PIF_VALUE: 2
PIF_VALUE: 21
PIF_VALUE: 20
PIF_VALUE: 20
PIF_VALUE: 21
PIF_VALUE: 18
PIF_VALUE: 21
PIF_VALUE: 21
PIF_VALUE: 18
PIF_VALUE: 18
PIF_VALUE: 19
PIF_VALUE: 19
PIF_VALUE: 14
PIF_VALUE: 21
PIF_VALUE: 2
PIF_VALUE: 0
PIF_VALUE: 1
PIF_VALUE: 19
PIF_VALUE: 20
PIF_VALUE: 18
PIF_VALUE: 14
PIF_VALUE: 20
PIF_VALUE: 21
PIF_VALUE: 20
PIF_VALUE: 21
PIF_VALUE: 20
PIF_VALUE: 21
PIF_VALUE: 20
PIF_VALUE: 18
PIF_VALUE: 18
PIF_VALUE: 20
PIF_VALUE: 19
PIF_VALUE: 21
PIF_VALUE: 32
PIF_VALUE: 20
PIF_VALUE: 19
PIF_VALUE: 19
PIF_VALUE: 20
PIF_VALUE: 18
PIF_VALUE: 20
PIF_VALUE: 14
PIF_VALUE: 20
PIF_VALUE: 21
PIF_VALUE: 20
PIF_VALUE: 25
PIF_VALUE: 21
PIF_VALUE: 21
PIF_VALUE: 5
PIF_VALUE: 20
PIF_VALUE: 2
PIF_VALUE: 18
PIF_VALUE: 21
PIF_VALUE: 20
PIF_VALUE: 18
PIF_VALUE: 28
PIF_VALUE: 1
PIF_VALUE: 19
PIF_VALUE: 20
PIF_VALUE: 1
PIF_VALUE: 0
PIF_VALUE: 21
PIF_VALUE: 28
PIF_VALUE: 20
PIF_VALUE: 21
PIF_VALUE: 18
PIF_VALUE: 20
PIF_VALUE: 12
PIF_VALUE: 21
PIF_VALUE: 19
PIF_VALUE: 19
PIF_VALUE: 20
PIF_VALUE: 18

## 2020-01-01 ASSESSMENT — PAIN SCALES - GENERAL
PAINLEVEL_OUTOF10: 10
PAINLEVEL_OUTOF10: 0
PAINLEVEL_OUTOF10: 7
PAINLEVEL_OUTOF10: 7
PAINLEVEL_OUTOF10: 2
PAINLEVEL_OUTOF10: 4
PAINLEVEL_OUTOF10: 4
PAINLEVEL_OUTOF10: 0
PAINLEVEL_OUTOF10: 5
PAINLEVEL_OUTOF10: 3
PAINLEVEL_OUTOF10: 4
PAINLEVEL_OUTOF10: 4
PAINLEVEL_OUTOF10: 2

## 2020-01-01 ASSESSMENT — PAIN DESCRIPTION - ORIENTATION
ORIENTATION: LEFT
ORIENTATION: RIGHT
ORIENTATION: LEFT

## 2020-01-01 ASSESSMENT — PAIN DESCRIPTION - LOCATION
LOCATION: NECK

## 2020-01-01 ASSESSMENT — PAIN DESCRIPTION - PAIN TYPE
TYPE: SURGICAL PAIN

## 2020-01-01 ASSESSMENT — PAIN DESCRIPTION - FREQUENCY
FREQUENCY: CONTINUOUS

## 2020-01-01 ASSESSMENT — PAIN DESCRIPTION - DESCRIPTORS
DESCRIPTORS: DISCOMFORT;SORE
DESCRIPTORS: DISCOMFORT;PATIENT UNABLE TO DESCRIBE
DESCRIPTORS: SORE;DISCOMFORT
DESCRIPTORS: DISCOMFORT;SORE
DESCRIPTORS: SORE;DISCOMFORT
DESCRIPTORS: SORE;ACHING
DESCRIPTORS: DISCOMFORT;PATIENT UNABLE TO DESCRIBE

## 2020-01-01 ASSESSMENT — PAIN DESCRIPTION - PROGRESSION
CLINICAL_PROGRESSION: NOT CHANGED

## 2020-01-01 ASSESSMENT — PAIN - FUNCTIONAL ASSESSMENT
PAIN_FUNCTIONAL_ASSESSMENT: ACTIVITIES ARE NOT PREVENTED
PAIN_FUNCTIONAL_ASSESSMENT: ACTIVITIES ARE NOT PREVENTED
PAIN_FUNCTIONAL_ASSESSMENT: 0-10
PAIN_FUNCTIONAL_ASSESSMENT: ACTIVITIES ARE NOT PREVENTED

## 2020-01-01 ASSESSMENT — PAIN DESCRIPTION - ONSET
ONSET: ON-GOING

## 2020-01-01 ASSESSMENT — LIFESTYLE VARIABLES: SMOKING_STATUS: 0

## 2020-01-02 ENCOUNTER — HOSPITAL ENCOUNTER (OUTPATIENT)
Age: 85
Discharge: HOME | End: 2020-01-04
Payer: MEDICARE

## 2020-01-02 LAB
ALBUMIN SERPL-MCNC: 4.2 G/DL (ref 3.5–5.2)
ALP BLD-CCNC: 112 U/L (ref 40–129)
ALT SERPL-CCNC: 18 U/L (ref 0–40)
ANION GAP SERPL CALCULATED.3IONS-SCNC: 13 MMOL/L (ref 7–16)
AST SERPL-CCNC: 27 U/L (ref 0–39)
BASOPHILS ABSOLUTE: 0.05 E9/L (ref 0–0.2)
BASOPHILS RELATIVE PERCENT: 0.7 % (ref 0–2)
BILIRUB SERPL-MCNC: 0.5 MG/DL (ref 0–1.2)
BUN BLDV-MCNC: 16 MG/DL (ref 8–23)
CALCIUM SERPL-MCNC: 9.7 MG/DL (ref 8.6–10.2)
CHLORIDE BLD-SCNC: 97 MMOL/L (ref 98–107)
CHOLESTEROL, TOTAL: 194 MG/DL (ref 0–199)
CO2: 27 MMOL/L (ref 22–29)
CREAT SERPL-MCNC: 0.7 MG/DL (ref 0.7–1.2)
EOSINOPHILS ABSOLUTE: 0.2 E9/L (ref 0.05–0.5)
EOSINOPHILS RELATIVE PERCENT: 2.8 % (ref 0–6)
GFR AFRICAN AMERICAN: >60
GFR NON-AFRICAN AMERICAN: >60 ML/MIN/1.73
GLUCOSE BLD-MCNC: 93 MG/DL (ref 74–99)
HCT VFR BLD CALC: 47.1 % (ref 37–54)
HDLC SERPL-MCNC: 78 MG/DL
HEMOGLOBIN: 14.2 G/DL (ref 12.5–16.5)
IMMATURE GRANULOCYTES #: 0.02 E9/L
IMMATURE GRANULOCYTES %: 0.3 % (ref 0–5)
LDL CHOLESTEROL CALCULATED: 104 MG/DL (ref 0–99)
LYMPHOCYTES ABSOLUTE: 1.3 E9/L (ref 1.5–4)
LYMPHOCYTES RELATIVE PERCENT: 18.3 % (ref 20–42)
MCH RBC QN AUTO: 30.4 PG (ref 26–35)
MCHC RBC AUTO-ENTMCNC: 30.1 % (ref 32–34.5)
MCV RBC AUTO: 100.9 FL (ref 80–99.9)
MONOCYTES ABSOLUTE: 0.55 E9/L (ref 0.1–0.95)
MONOCYTES RELATIVE PERCENT: 7.7 % (ref 2–12)
NEUTROPHILS ABSOLUTE: 5 E9/L (ref 1.8–7.3)
NEUTROPHILS RELATIVE PERCENT: 70.2 % (ref 43–80)
PDW BLD-RTO: 13.7 FL (ref 11.5–15)
PLATELET # BLD: 252 E9/L (ref 130–450)
PMV BLD AUTO: 9.6 FL (ref 7–12)
POTASSIUM SERPL-SCNC: 4.5 MMOL/L (ref 3.5–5)
RBC # BLD: 4.67 E12/L (ref 3.8–5.8)
SODIUM BLD-SCNC: 137 MMOL/L (ref 132–146)
TOTAL PROTEIN: 8 G/DL (ref 6.4–8.3)
TRIGL SERPL-MCNC: 61 MG/DL (ref 0–149)
TSH SERPL DL<=0.05 MIU/L-ACNC: 2.73 UIU/ML (ref 0.27–4.2)
VITAMIN D 25-HYDROXY: 42 NG/ML (ref 30–100)
VLDLC SERPL CALC-MCNC: 12 MG/DL
WBC # BLD: 7.1 E9/L (ref 4.5–11.5)

## 2020-01-02 PROCEDURE — 82306 VITAMIN D 25 HYDROXY: CPT

## 2020-01-02 PROCEDURE — 84443 ASSAY THYROID STIM HORMONE: CPT

## 2020-01-02 PROCEDURE — 80061 LIPID PANEL: CPT

## 2020-01-02 PROCEDURE — 85025 COMPLETE CBC W/AUTO DIFF WBC: CPT

## 2020-01-02 PROCEDURE — 80053 COMPREHEN METABOLIC PANEL: CPT

## 2020-05-06 PROBLEM — I71.40 ABDOMINAL AORTIC ANEURYSM (AAA) WITHOUT RUPTURE: Status: ACTIVE | Noted: 2020-01-01

## 2020-05-06 PROBLEM — R09.89 RIGHT CAROTID BRUIT: Status: ACTIVE | Noted: 2020-01-01

## 2020-05-06 NOTE — PROGRESS NOTES
Vascular Surgery Outpatient Consultation        Reason for Consult:    Chief Complaint   Patient presents with   Christiano Click Consultation     new pt. AAA       Requesting Physician:  No referring provider defined for this encounter. Chief Complaint   Patient presents with   Christiano Click Consultation     new pt. AAA       HISTORY OF PRESENT ILLNESS:                The patient is a 80 y.o. male who is referred for evaluation of patient with a recent diagnosis of abdominal aortic aneurysm. He to me that he knew that he had an aneurysm but was unaware of the size. He is recently been evaluated. He is had an ultrasound examination demonstrating a 5.4 cm abdominal aortic aneurysm. The family was under the impression was about 5.6 cm. He has a history of a prior ulcer surgery followed by several years later with abdominal surgery secondary to gastric cancer. Overall he is doing well. He denies any additional abdominal surgery. He denies any chest pain shortness of breath or discomfort. He denies any right-sided left-sided weakness numbness or vision changes. He does get some intermittent calf pain and discomfort with walking but this is variable in distance. He denies any distal embolic lower extremity phenomenon, rest pain or lower extremity ulcerations. .    Past Medical History:        Diagnosis Date    Anemia     Arthritis     generalized    Cancer ([facility] [address]     Chronic kidney disease     overactive bladder    Duodenal ulcer     required surgery    Hypertension      Past Surgical History:        Procedure Laterality Date    ABDOMEN SURGERY      for duodenal ulcer    CARDIAC CATHETERIZATION      CATARACT REMOVAL WITH IMPLANT  12 05 2011    left    CATARACT REMOVAL WITH IMPLANT  1/9/2012    right     ECHO COMPL W DOP COLOR FLOW  12/7/2013 EF 45-50%    suggestive of diastolic dysfunction    HERNIA REPAIR      VASCULAR SURGERY      bilat varicose vein surgery October 2013     Current Medications:   Prior to Admission medications    Medication Sig Start Date End Date Taking? Authorizing Provider   heparin flush 100 UNIT/ML injection 1 mL by Intercatheter route as needed. 12/27/13  Yes Edgar Membreno MD   citalopram (CELEXA) 10 MG tablet Take 1 tablet by mouth daily for 30 days. 12/18/13 5/6/20 Yes Edgar Membreno MD   metoprolol (TOPROL-XL) 50 MG XL tablet Take 1 tablet by mouth every 12 hours for 30 days. 12/18/13 5/6/20 Yes Edgar Membreno MD   mineral oil-hydrophilic petrolatum (AQUAPHOR) ointment Apply twice a day 12/18/13  Yes Edgar Membreno MD   tamsulosin (FLOMAX) 0.4 MG capsule Take 1 capsule by mouth nightly. 12/18/13  Yes Edgar Membreno MD   losartan (COZAAR) 50 MG tablet Take 50 mg by mouth daily. Yes Historical Provider, MD   docusate sodium (COLACE) 100 MG capsule Take 100 mg by mouth 2 times daily. Yes Historical Provider, MD   lidocaine (LIDODERM) 5 % Place 1 patch onto the skin daily. 12 hours on, 12 hours off. 11/8/13  Yes Edgar Membreno MD   lansoprazole (PREVACID) 30 MG capsule Take 1 capsule by mouth 2 times daily. 11/8/13  Yes MD Junito Knox Palmetto, Serenoa repens, 1000 MG CAPS Take  by mouth. Yes Historical Provider, MD   zolpidem (AMBIEN) 10 MG tablet Take 10 mg by mouth nightly as needed. Yes Historical Provider, MD   ferrous sulfate 325 (65 FE) MG EC tablet Take 325 mg by mouth 2 times daily. Yes Historical Provider, MD   Timolol Maleate 0.5 % (DAILY) SOLN Apply 1 drop to eye daily. Both eyes   Yes Historical Provider, MD   finasteride (PROSCAR) 5 MG tablet Take 5 mg by mouth daily. Stopped 2 weeks pre-op as directed    Yes Historical Provider, MD     Allergies:  Patient has no known allergies.     Social History     Socioeconomic History    Marital status:      Spouse name: Not on file    Number of children: Not on file    Years of education: Not on file    Highest education level: Not on file   Occupational History    Not on file   Social Needs    Financial resource Abdominal pain:  No [x]/Yes []                     Intestinal bleeding: No [x]/Yes []  Musculoskeletal:             Leg pain:   No [x]/Yes []      Back pain:   No [x]/Yes []                    Weakness:   No [x]/Yes []  Neurologic:             Numbness:   No [x]/Yes []      Paralysis:   No [x]/Yes []                       Headaches:   No [x]/Yes []  Hematologic, lymphatic:   Anemia:   No [x]/Yes []              Bleeding or bruising:  No [x]/Yes []              Fevers or chills: No [x]/Yes []  Endocrine:             Temp intolerance:   No [x]/Yes []                       Polydipsia, polyuria:  No [x]/Yes []  Skin:              Rash:    No [x]/Yes []      Ulcers:   No [x]/Yes []              Abnorm pigment: No [x]/Yes []  :              Frequency/urgency:  No [x]/Yes []      Hematuria:    No [x]/Yes []                      Incontinence:    No [x]/Yes []    PHYSICAL EXAM:  Vitals:    05/06/20 0822   BP: 124/78   Pulse: 78   Resp: 16     General Appearance: alert and oriented to person, place and time, well developed and well- nourished, in no acute distress  Skin: warm and dry, no rash or erythema  Head: normocephalic and atraumatic  Eyes: extraocular eye movements intact, conjunctivae normal  ENT: external ear and ear canal normal bilaterally, nose without deformity  Pulmonary/Chest: clear to auscultation bilaterally- no wheezes, rales or rhonchi, normal air movement, no respiratory distress  Cardiovascular: Positive right-sided carotid bruit normal rate, regular rhythm, normal S1 and S2, no murmurs  Abdomen: Pulsatile abdominal mass soft, non-tender, non-distended, normal bowel sounds, no masses or organomegaly  Musculoskeletal: normal range of motion, no joint swelling, deformity or tenderness  Neurologic: no cranial nerve deficit, gait, coordination and speech normal  Extremities: Bilateral palpable femoral pulses. Right is less than the left side. Signals are present in the DP and PTs.   Motor and sensation

## 2020-05-07 NOTE — TELEPHONE ENCOUNTER
Notified patient of testing at [facility] on 5-13-20. He states he already talked to scheduling and has the instructions.

## 2020-05-20 PROBLEM — I73.9 PERIPHERAL VASCULAR DISEASE (HCC): Status: ACTIVE | Noted: 2020-01-01

## 2020-05-20 NOTE — PROGRESS NOTES
Vascular Surgery Outpatient Consultation        Reason for Consult:    Chief Complaint   Patient presents with    Follow-up     ct-scan results       Requesting Physician:  No referring provider defined for this encounter. Chief Complaint   Patient presents with    Follow-up     ct-scan results       HISTORY OF PRESENT ILLNESS:                The patient is a 80 y.o. male who is referred for evaluation of patient with a recent diagnosis of abdominal aortic aneurysm. He to me that he knew that he had an aneurysm but was unaware of the size. He is recently been evaluated. He is had an ultrasound examination demonstrating a 5.4 cm abdominal aortic aneurysm. The family was under the impression was about 5.6 cm. He has a history of a prior ulcer surgery followed by several years later with abdominal surgery secondary to gastric cancer. Overall he is doing well. He denies any additional abdominal surgery. He denies any chest pain shortness of breath or discomfort. He denies any right-sided left-sided weakness numbness or vision changes. He does get some intermittent calf pain and discomfort with walking but this is variable in distance. He denies any distal embolic lower extremity phenomenon, rest pain or lower extremity ulcerations. .    5/20/2020  He is currently here to discuss the results. At this point he has no pain or discomfort. He has no abdominal pain. He has left-sided lower extremity claudication. I have reviewed the CT scan and  The aneurysm measures approximately 4-1/2 cm in size. 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     Past Medical History:        Diagnosis Date    Anemia     Arthritis     generalized    Cancer ([facility] [address]     Chronic kidney disease     overactive bladder    Duodenal ulcer     required surgery    Hypertension      Past Surgical History:        Procedure Laterality Date    ABDOMEN SURGERY      for duodenal ulcer    CARDIAC CATHETERIZATION      CATARACT REMOVAL WITH IMPLANT  12 05 2011    left    CATARACT REMOVAL WITH IMPLANT  1/9/2012    right     ECHO COMPL W DOP COLOR FLOW  12/7/2013 EF 45-50%    suggestive of diastolic dysfunction    HERNIA REPAIR      VASCULAR SURGERY      bilat varicose vein surgery October 2013     Current Medications:   Prior to Admission medications    Medication Sig Start Date End Date Taking? Authorizing Provider   heparin flush 100 UNIT/ML injection 1 mL by Intercatheter route as needed. 12/27/13  Yes Edgar Johns MD   citalopram (CELEXA) 10 MG tablet Take 1 tablet by mouth daily for 30 days. 12/18/13 5/20/20 Yes Edgar Johns MD   metoprolol (TOPROL-XL) 50 MG XL tablet Take 1 tablet by mouth every 12 hours for 30 days. 12/18/13 5/20/20 Yes Edgar Johns MD   mineral oil-hydrophilic petrolatum (AQUAPHOR) ointment Apply twice a day 12/18/13  Yes Edgar Johns MD   tamsulosin (FLOMAX) 0.4 MG capsule Take 1 capsule by mouth nightly. 12/18/13  Yes Edgar Johns MD   losartan (COZAAR) 50 MG tablet Take 50 mg by mouth daily. Yes Historical Provider, MD   docusate sodium (COLACE) 100 MG capsule Take 100 mg by mouth 2 times daily. Yes Historical Provider, MD   lidocaine (LIDODERM) 5 % Place 1 patch onto the skin daily. 12 hours on, 12 hours off. 11/8/13  Yes Edgar Johns MD   lansoprazole (PREVACID) 30 MG capsule Take 1 capsule by mouth 2 times daily. 11/8/13  Yes MD Junito Luis Serenoa repens, 1000 MG CAPS Take  by mouth. Yes Historical Provider, MD   zolpidem (AMBIEN) 10 MG tablet Take 10 mg by mouth nightly as needed. Yes Historical Provider, MD   ferrous sulfate 325 (65 FE) MG EC tablet Take 325 mg by mouth 2 times daily. Yes Historical Provider, MD   Timolol Maleate 0.5 % (DAILY) SOLN Apply 1 drop to eye daily. Both eyes   Yes Historical Provider, MD   finasteride (PROSCAR) 5 MG tablet Take 5 mg by mouth daily.  Stopped 2 weeks pre-op as directed    Yes Historical Provider, MD     Allergies: dissection.           CT FINDINGS:       No acute process noted in the visualized lung bases.  Mild subpleural   articulation suggesting chronic interstitial fibrotic changes.  There is   cardiomegaly.  No pericardial effusion.       No acute abnormality of the visualized liver, spleen, pancreas, adrenal   glands or kidneys.  No evidence of hydronephrosis. Gaviota Holcomb is an exophytic   right lower pole renal cortical cyst measuring approximately 8 mm in diameter.       There is extensive diverticulosis of the colon without evidence of acute   diverticulitis.  No bowel obstruction.       The bladder and prostate demonstrate no acute abnormality.  Fat containing   right inguinal hernia without acute hernia complication.       Age related degenerative changes of the visualized osseous structures without   focal destructive lesion.  Anterior flowing osteophytes suggesting DISH. Multilevel degenerative changes noted of the spine.           Impression   Infrarenal abdominal aortic aneurysm measuring 4.4 cm in diameter.  Aneurysm   has increased in diameter since prior examination where it measured 3.6 cm. Please see below for follow-up recommendations.       Moderate to severe atherosclerotic disease of the aorta and branch vessels   with narrowing of the celiac axis, SMA and bilateral renal artery origins as   described above.       Severe atherosclerotic disease in the left external iliac artery and left   common femoral artery likely causing hemodynamically significant stenosis   greater than 70% with decreased enhancement of the distal left superficial   and deep femoral arteries.       RECOMMENDATIONS:   4.4 cm abdominal aortic aneurysm.  Recommend follow-up every 12 months and   vascular consultation.       Reference: J Am Tracy Radiol 2013;10:789-794.           Problem List Items Addressed This Visit     Abdominal aortic aneurysm (AAA) without rupture ([facility] [address] - Primary    Peripheral vascular disease ([facility] [address]            #1 abdominal aortic aneurysm. He has a 4.5 cm abdominal aortic aneurysm. Is tortuous. He has significant distal external iliac and common femoral artery occlusion on the left side. At this point we will repeat the ultrasound in 6 months he will call there is any questions or concerns. I discussed with him the natural history of aortic aneurysmal disease. He will follow-up to discuss the results of the testing. #2   He has approximately 70 to 99% carotid artery stenosis on the left side. He is asymptomatic. I recommend antiplatelet therapy aspirin 81 mg and a statin. We can also consider a CT angiogram.        No follow-ups on file.

## 2020-09-11 PROBLEM — I65.23 BILATERAL CAROTID ARTERY STENOSIS: Status: ACTIVE | Noted: 2020-01-01

## 2020-09-11 NOTE — PROGRESS NOTES
Vascular Surgery Outpatient Consultation        Reason for Consult:    Chief Complaint   Patient presents with    Follow-up     here to discuss ct result       Requesting Physician:  No referring provider defined for this encounter. Chief Complaint   Patient presents with    Follow-up     here to discuss ct result       HISTORY OF PRESENT ILLNESS:                The patient is a 80 y.o. male who is referred for evaluation of patient with a recent diagnosis of abdominal aortic aneurysm. He to me that he knew that he had an aneurysm but was unaware of the size. He is recently been evaluated. He is had an ultrasound examination demonstrating a 5.4 cm abdominal aortic aneurysm. The family was under the impression was about 5.6 cm. He has a history of a prior ulcer surgery followed by several years later with abdominal surgery secondary to gastric cancer. Overall he is doing well. He denies any additional abdominal surgery. He denies any chest pain shortness of breath or discomfort. He denies any right-sided left-sided weakness numbness or vision changes. He does get some intermittent calf pain and discomfort with walking but this is variable in distance. He denies any distal embolic lower extremity phenomenon, rest pain or lower extremity ulcerations. .    5/20/2020  He is currently here to discuss the results. At this point he has no pain or discomfort. He has no abdominal pain. He has left-sided lower extremity claudication. I have reviewed the CT scan and  The aneurysm measures approximately 4-1/2 cm in size. He has a left external iliac common femoral artery occlusion      He now presents to discuss his carotid artery disease. He has critical high-grade carotid artery disease. We have ordered a CT scan which we have the results currently. This was reviewed with the patient and family at the bedside. Right now he denies any right-sided left-sided weakness numbness he denies any vision changes. He denies any chest pain or shortness of breath. He otherwise is in his regular physical state of health. He told me that he believes he is going to have an echocardiogram in the near future by Dr. Gonzales Azevedo or at least be referred to a cardiologist regarding this. He is on risk reduction therapy currently and otherwise is doing well. We follow him for his aneurysm see above. .    Past Medical History:        Diagnosis Date    Anemia     Arthritis     generalized    Cancer ([address]     Chronic kidney disease     overactive bladder    Duodenal ulcer     required surgery    Hypertension      Past Surgical History:        Procedure Laterality Date    ABDOMEN SURGERY      for duodenal ulcer    CARDIAC CATHETERIZATION      CATARACT REMOVAL WITH IMPLANT  12 05 2011    left    CATARACT REMOVAL WITH IMPLANT  1/9/2012    right     ECHO COMPL W DOP COLOR FLOW  12/7/2013 EF 45-50%    suggestive of diastolic dysfunction    HERNIA REPAIR      VASCULAR SURGERY      bilat varicose vein surgery October 2013     Current Medications:   Prior to Admission medications    Medication Sig Start Date End Date Taking? Authorizing Provider   lisinopril (PRINIVIL;ZESTRIL) 2.5 MG tablet Take 2.5 mg by mouth daily   Yes Historical Provider, MD   heparin flush 100 UNIT/ML injection 1 mL by Intercatheter route as needed. 12/27/13  Yes Edgar Stevens MD   citalopram (CELEXA) 10 MG tablet Take 1 tablet by mouth daily for 30 days. 12/18/13 9/2/20 Yes Edgar Stevens MD   metoprolol (TOPROL-XL) 50 MG XL tablet Take 1 tablet by mouth every 12 hours for 30 days. 12/18/13 9/2/20 Yes Edgar Stevens MD   mineral oil-hydrophilic petrolatum (AQUAPHOR) ointment Apply twice a day 12/18/13  Yes Edgar Stevens MD   tamsulosin (FLOMAX) 0.4 MG capsule Take 1 capsule by mouth nightly. 12/18/13  Yes Edgar Stevens MD   losartan (COZAAR) 50 MG tablet Take 50 mg by mouth daily.    Yes Historical Provider, MD   docusate sodium (COLACE) 100 MG capsule Take 100 mg by mouth 2 times daily. Yes Historical Provider, MD   lidocaine (LIDODERM) 5 % Place 1 patch onto the skin daily. 12 hours on, 12 hours off. 11/8/13  Yes Edgar hWitaker MD   lansoprazole (PREVACID) 30 MG capsule Take 1 capsule by mouth 2 times daily. 11/8/13  Yes Edgar Whitaker MD   Saw Palmetto, Serenoa repens, 1000 MG CAPS Take  by mouth. Yes Historical Provider, MD   zolpidem (AMBIEN) 10 MG tablet Take 10 mg by mouth nightly as needed. Yes Historical Provider, MD   ferrous sulfate 325 (65 FE) MG EC tablet Take 325 mg by mouth 2 times daily. Yes Historical Provider, MD   Timolol Maleate 0.5 % (DAILY) SOLN Apply 1 drop to eye daily. Both eyes   Yes Historical Provider, MD   finasteride (PROSCAR) 5 MG tablet Take 5 mg by mouth daily. Stopped 2 weeks pre-op as directed    Yes Historical Provider, MD     Allergies:  Patient has no known allergies.     Social History     Socioeconomic History    Marital status:      Spouse name: Not on file    Number of children: Not on file    Years of education: Not on file    Highest education level: Not on file   Occupational History    Not on file   Social Needs    Financial resource strain: Not on file    Food insecurity     Worry: Not on file     Inability: Not on file    Transportation needs     Medical: Not on file     Non-medical: Not on file   Tobacco Use    Smoking status: Former Smoker    Smokeless tobacco: Never Used   Substance and Sexual Activity    Alcohol use: Yes     Comment: beer/wine daily    Drug use: Not on file    Sexual activity: Not on file   Lifestyle    Physical activity     Days per week: Not on file     Minutes per session: Not on file    Stress: Not on file   Relationships    Social connections     Talks on phone: Not on file     Gets together: Not on file     Attends [employer] service: Not on file     Active member of club or organization: Not on file     Attends meetings of clubs or organizations: Not on file Relationship status: Not on file    Intimate partner violence     Fear of current or ex partner: Not on file     Emotionally abused: Not on file     Physically abused: Not on file     Forced sexual activity: Not on file   Other Topics Concern    Not on file   Social History Narrative    Not on file        History reviewed. No pertinent family history.     REVIEW OF SYSTEMS (New symptoms):    Eyes:      Blurred vision:  No [x]/Yes []               Diplopia:   No [x]/Yes []               Vision loss:       No [x]/Yes []   Ears, nose, throat:             Hearing loss:    No [x]/Yes []      Vertigo:   No [x]/Yes []                       Swallowing problem:  No [x]/Yes []               Nose bleeds:   No [x]/Yes []      Voice hoarseness:  No [x]/Yes []  Respiratory:             Cough:   No [x]/Yes []      Pleuritic chest pain:  No [x]/Yes []                        Dyspnea:   No [x]/Yes []      Wheezing:   No [x]/Yes []  Cardiovascular:             Angina:   No [x]/Yes []      Palpitations:   No [x]/Yes []          Claudication:    No [x]/Yes []      Leg swelling:   No [x]/Yes []  Gastrointestinal:             Nausea or vomiting:  No [x]/Yes []               Abdominal pain:  No [x]/Yes []                     Intestinal bleeding: No [x]/Yes []  Musculoskeletal:             Leg pain:   No [x]/Yes []      Back pain:   No [x]/Yes []                    Weakness:   No [x]/Yes []  Neurologic:             Numbness:   No [x]/Yes []      Paralysis:   No [x]/Yes []                       Headaches:   No [x]/Yes []  Hematologic, lymphatic:   Anemia:   No [x]/Yes []              Bleeding or bruising:  No [x]/Yes []              Fevers or chills: No [x]/Yes []  Endocrine:             Temp intolerance:   No [x]/Yes []                       Polydipsia, polyuria:  No [x]/Yes []  Skin:              Rash:    No [x]/Yes []      Ulcers:   No [x]/Yes []              Abnorm pigment: No [x]/Yes []  :              Frequency/urgency:  No [x]/Yes []      Hematuria:    No [x]/Yes []                      Incontinence:    No [x]/Yes []    PHYSICAL EXAM:  Vitals:    09/02/20 1324   BP: 138/74   Resp: 16     General Appearance: alert and oriented to person, place and time, well developed and well- nourished, in no acute distress  Skin: warm and dry, no rash or erythema  Head: normocephalic and atraumatic  Eyes: extraocular eye movements intact, conjunctivae normal  ENT: external ear and ear canal normal bilaterally, nose without deformity  Pulmonary/Chest: clear to auscultation bilaterally- no wheezes, rales or rhonchi, normal air movement, no respiratory distress  Cardiovascular: Positive right-sided carotid bruit normal rate, regular rhythm, normal S1 and S2, no murmurs  Abdomen: Pulsatile abdominal mass soft, non-tender, non-distended, normal bowel sounds, no masses or organomegaly  Musculoskeletal: normal range of motion, no joint swelling, deformity or tenderness  Neurologic: no cranial nerve deficit, gait, coordination and speech normal  Extremities: .  Signals are present in the DPs and PTs bilateral.  On the left side I thought I could feel a femoral pulse in the last visit.   On today's examination I cannot which is consistent with the CT scan    EXAMINATION:   ULTRASOUND EVALUATION OF THE CAROTID ARTERIES       5/13/2020       COMPARISON:   None.       HISTORY:   ORDERING SYSTEM PROVIDED HISTORY: Carotid artery stenosis, asymptomatic,   bilateral   TECHNOLOGIST PROVIDED HISTORY:   Reason for exam:->carotid artery stenosis       FINDINGS:       RIGHT:       The right common carotid artery demonstrates peak systolic velocities of 841   and 90 cm/sec in the proximal and distal segments respectively.       The right internal carotid artery demonstrates the systolic velocities of   025, 101, and 87 cm/sec in the proximal, mid and distal segments respectively.       The external carotid artery is patent.  The vertebral artery demonstrates   normal antegrade greater than 0.7). The Doppler derived arterial velocity waveforms of the left posterior tibial   artery and dorsalis pedis arteries are both monophasic.           Impression   Right side: Slightly abnormal ankle waveforms suggesting at least mild right   lower extremity arterial insufficiency.  The EMERY of 1.49 is falsely elevated   due to noncompressible vessels.  Decreased 1st digit index of 0.51 suggesting   moderate microvascular disease in the foot.       Left side: Markedly abnormal waveforms of the left lower extremity with an   EMERY of 0.66 and a 1st digit index of 0.38.  In conjunction with findings from   the recent CTA, this is likely secondary to severe iliac inflow disease as   well as moderate to severe microvascular disease in the foot. No radiation information found for this patient   Narrative   EXAMINATION:   CTA OF THE ABDOMEN AND PELVIS WITH CONTRAST       5/13/2020 7:59 am:       TECHNIQUE:   CTA of the abdomen and pelvis was performed with the administration of   intravenous contrast. Multiplanar reformatted images are provided for review. MIP images are provided for review.  Dose modulation, iterative   reconstruction, and/or weight based adjustment of the mA/kV was utilized to   reduce the radiation dose to as low as reasonably achievable.       COMPARISON:   None.       HISTORY:   ORDERING SYSTEM PROVIDED HISTORY: Abdominal aortic aneurysm without rupture   [facility])   TECHNOLOGIST PROVIDED HISTORY:   Reason for exam:->AAA       FINDINGS:       CTA ABDOMEN:       There is an infrarenal abdominal aortic aneurysm measuring 4.4 cm in diameter   previously measuring 3.6 cm in diameter.  The aneurysm is fusiform in shape   beginning approximately 4.2 cm below the lower most left renal artery and   extending up to the level of the aortic bifurcation.  There is no evidence of   aortic dissection.  Moderate atherosclerotic disease of the aorta and branch   vessels.  The celiac axis and SMA are patent. Rosalie Oswald, there is calcified   atherosclerotic plaque at the origin of the SMA causing approximately 50%   narrowing and celiac axis causing approximately 60% narrowing.  There is also   atherosclerotic plaque at the origins of the bilateral renal arteries with   50-60% narrowing.  The GRETCHEN is patent.  No evidence of acute aneurysmal   rupture.  No active extravasation of contrast.           CTA PELVIS:       Moderate atherosclerotic disease of the aorta and branch vessels with severe   calcified atherosclerotic disease of the left external iliac artery and left   femoral artery.  There is likely hemodynamically significant stenosis of the   left external iliac and common femoral artery with decreased enhancement of   the left superficial and deep femoral arteries compared to the right.  No   evidence of dissection.           CT FINDINGS:       No acute process noted in the visualized lung bases.  Mild subpleural   articulation suggesting chronic interstitial fibrotic changes.  There is   cardiomegaly.  No pericardial effusion.       No acute abnormality of the visualized liver, spleen, pancreas, adrenal   glands or kidneys.  No evidence of hydronephrosis. Stevens Cristal is an exophytic   right lower pole renal cortical cyst measuring approximately 8 mm in diameter.       There is extensive diverticulosis of the colon without evidence of acute   diverticulitis.  No bowel obstruction.       The bladder and prostate demonstrate no acute abnormality.  Fat containing   right inguinal hernia without acute hernia complication.       Age related degenerative changes of the visualized osseous structures without   focal destructive lesion.  Anterior flowing osteophytes suggesting DISH. Multilevel degenerative changes noted of the spine.           Impression   Infrarenal abdominal aortic aneurysm measuring 4.4 cm in diameter.  Aneurysm   has increased in diameter since prior examination where it measured 3.6 cm.    Please see We also discussed his age and multiple comorbidities. He will be evaluated by cardiology prior to considering any additional intervention. He will follow-up in 1 month to discuss any questions or concerns. I discussed at length with the patient and family the signs and symptoms of stroke and what to do. No follow-ups on file.

## 2020-10-02 NOTE — TELEPHONE ENCOUNTER
Pt's daughter, Naif Moore, phoned stating pt saw Dr. Pete Govea yesterday and has been cleared for CEA. Scheduled (L) CEA 10/22 with Dr. Kamryn Tapia pending the official clearance from Dr. Pete Govea.

## 2020-10-08 NOTE — TELEPHONE ENCOUNTER
Confirmed with Dr. Singh [facility] office that pt is cleared for CEA, they will fax when available

## 2020-10-14 NOTE — PROGRESS NOTES
Patient having covid testing at [facility]. [place] on 10/16/20. Patient asked to bring ID and to self quarantine until day of procedure.  Spoke to patients daughter in law Brigida.

## 2020-10-19 NOTE — PROGRESS NOTES
[address] 36 PRE-ADMISSION TESTING GENERAL INSTRUCTIONS- [facility]-phone number:[phone]    GENERAL INSTRUCTIONS  [x] Antibacterial Soap shower Night before and/or AM of Surgery  [x] Nothing by mouth after midnight, including gum, candy, mints, or water. [x] You may brush your teeth, gargle, but do NOT swallow water   [x]No smoking, chewing tobacco, illegal drugs, or alcohol within 24 hours of your surgery. [x] Jewelry, valuables or body piercing's should not be brought to the hospital. All body and/or tongue piercing's must be removed prior to arriving to hospital.  ALL hair pins must be removed. [x] Do not wear  lotions, powders, deodorant. [x] Bring insurance card and photo ID. [x] Transfusion Bracelet: Please bring with you to hospital, day of surgeryl. PARKING INSTRUCTIONS:   [x] Arrival Time:  6 AM ON October 22          [x] To reach the [facility] from [address] [facility], upon entering the hospital, take elevator B to the 3rd floor. EDUCATION INSTRUCTIONS:      [x] Pre-admission Testing educational folder given  [x] Incentive Spirometry,coughing & deep breathing exercises reviewed. [x]Pain: Post-op pain is normal and to be expected. You will be asked to rate your pain from 0-10(a zero is not acceptable-education is needed). Your post-op pain goal is:  [x] Ask your nurse for your pain medication. MEDICATION INSTRUCTIONS:   [x]Bring a complete list of your medications, please write the last time you took the medicine, give this list to the nurse. [x] Take the following medications the morning of surgery with 1-2 ounces of water: SEE MED SHEET  [x] Stop herbal supplements and vitamins 5 days before your surgery. [x] Follow physician instructions regarding any blood thinners you may be taking.     WHAT TO EXPECT:  [x] The day of surgery you will be greeted and checked in by the [employer].  In addition, you will be registered in the [facility] by a Patient Access Representative. Please bring your photo ID and insurance card. A nurse will greet you in accordance to the time you are needed in the pre-op area to prepare you for surgery. Please do not be discouraged if you are not greeted in the order you arrive as there are many variables that are involved in patient preparation. Your patience is greatly appreciated as you wait for your nurse. Please bring in items such as: books, magazines, newspapers, electronics, or any other items  to occupy your time in the waiting area. [x]  Delays may occur with surgery and staff will make a sincere effort to keep you informed of delays. If any delays occur with your procedure, we apologize ahead of time for your inconvenience as we recognize the value of your time.

## 2020-10-22 PROBLEM — I65.22 STENOSIS OF LEFT CAROTID ARTERY: Status: ACTIVE | Noted: 2020-01-01

## 2020-10-22 PROBLEM — I65.21 CAROTID ARTERY STENOSIS, ASYMPTOMATIC, RIGHT: Status: ACTIVE | Noted: 2020-01-01

## 2020-10-22 NOTE — H&P
Vascular Surgery History & Physical Exam      Chief Complaint: CRF    HISTORY OF PRESENT ILLNESS:                The patient is a 80 y.o. male who presents to the hospital for elective left carotid endarterectomy. He denies any right-sided left-sided weakness numbness or vision changes. He denies any chest pain or shortness of breath. He has severe left-sided carotid artery disease with a near occlusive lesion.     IMPRESSION:   Active Hospital Problems    Diagnosis    Stenosis of left carotid artery [I65.22]       PLAN: Left carotid endarterectomy    Risk benefits alternatives were discussed with the patient including but not limited to bleeding, infection, arteriovenous nerve injury, nerve injury, stroke, myocardial infarction, respiratory failure, recurrence of the disease, sensorimotor disturbance swallowing dysfunction wound complications and or death he understands wishes to proceed    This conversation also took place in the office with his family    Patient Active Problem List   Diagnosis Code    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, 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    Anemia, iron deficiency D50.9    Hypertension I10    Enlarged prostate with lower urinary tract symptoms (LUTS) N40.1    Gastric adenocarcinoma (HCC) C16.9    Mediastinal adenopathy R59.0    Lung nodule seen on imaging study R91.1    Mitral regurgitation and aortic stenosis I08.0    Intra-abdominal abscess post-procedure T81.43XA    Protein-calorie malnutrition, severe ([address] E43    Pneumonia J18.9    Small bowel anastomotic leak K91.89    Diarrhea R19.7    Hyponatremia E87.1    Abdominal aortic aneurysm (AAA) without rupture ([address] I71.4    Right carotid bruit R09.89    Peripheral vascular disease (HCC) I73.9    Stenosis of left carotid artery I65.22       Past Medical History:   Diagnosis Date    Anemia     Arthritis     generalized    Cancer ([address]     Chronic kidney disease     overactive bladder    Duodenal ulcer     required surgery    [place] (hard of hearing)     wears aides    Hypertension         Past Surgical History:   Procedure Laterality Date    ABDOMEN SURGERY      for duodenal ulcer    CARDIAC CATHETERIZATION      CATARACT REMOVAL WITH IMPLANT  12 05 2011    left    CATARACT REMOVAL WITH IMPLANT  1/9/2012    right     ECHO COMPL W DOP COLOR FLOW  12/7/2013 EF 45-50%    suggestive of diastolic dysfunction    HERNIA REPAIR      VASCULAR SURGERY      bilat varicose vein surgery October 2013       Current Medications:     Current Facility-Administered Medications:     sodium chloride flush 0.9 % injection 10 mL, 10 mL, Intravenous, 2 times per day, Bereket Alexander MD    sodium chloride flush 0.9 % injection 10 mL, 10 mL, Intravenous, PRN, Bereket Alexander MD    ceFAZolin (ANCEF) 2 g in sterile water 20 mL IV syringe, 2 g, Intravenous, On Call to OR, Bereket Alexander MD    0.9 % sodium chloride infusion, , Intravenous, Continuous, Bereket Alexander MD, Last Rate: 100 mL/hr at 10/22/20 0656    fentaNYL (SUBLIMAZE) injection 25 mcg, 25 mcg, Intravenous, Q5 Min PRN, Annie Dawson MD    fentaNYL (SUBLIMAZE) injection 50 mcg, 50 mcg, Intravenous, Q5 Min PRN, Annie Dawson MD    HYDROmorphone (DILAUDID) injection 0.25 mg, 0.25 mg, Intravenous, Q5 Min PRN, Annie Dawson MD    HYDROmorphone (DILAUDID) injection 0.5 mg, 0.5 mg, Intravenous, Q5 Min PRN, Annie Dawson MD    oxyCODONE-acetaminophen (PERCOCET) 5-325 MG per tablet 1 tablet, 1 tablet, Oral, Once PRN, Christel Blackwood MD    diphenhydrAMINE (BENADRYL) injection 12.5 mg, 12.5 mg, Intravenous, Once PRN, Annie Dawson MD    promethazine (PHENERGAN) injection 6.25 mg, 6.25 mg, Intravenous, Q15 Min PRN, Annie Dawson MD    meperidine (DEMEROL) injection 12.5 mg, 12.5 mg, Intravenous, Q5 Min PRN, Christel Blackwood MD    Allergies:  Patient has no known allergies.     Social History     Socioeconomic History    Marital status:      Spouse name: Not on file    Number of children: Not on file    Years of education: Not on file    Highest education level: Not on file   Occupational History    Not on file   Social Needs    Financial resource strain: Not on file    Food insecurity     Worry: Not on file     Inability: Not on file    Transportation needs     Medical: Not on file     Non-medical: Not on file   Tobacco Use    Smoking status: Former Smoker    Smokeless tobacco: Never Used   Substance and Sexual Activity    Alcohol use: Yes     Comment: beer/wine daily    Drug use: Not on file    Sexual activity: Not on file   Lifestyle    Physical activity     Days per week: Not on file     Minutes per session: Not on file    Stress: Not on file   Relationships    Social connections     Talks on phone: Not on file     Gets together: Not on file     Attends Rastafarian service: Not on file     Active member of club or organization: Not on file     Attends meetings of clubs or organizations: Not on file     Relationship status: Not on file    Intimate partner violence     Fear of current or ex partner: Not on file     Emotionally abused: Not on file     Physically abused: Not on file     Forced sexual activity: Not on file   Other Topics Concern    Not on file   Social History Narrative    Not on file        No family history on file. REVIEW OF SYSTEMS:  The chart was reviewed.     PHYSICAL EXAM:    Vitals:    10/22/20 0618   BP: (!) 148/68   Pulse: 84   Resp: 14   Temp: 98.2 °F (36.8 °C)   SpO2: 92%     CONSTITUTIONAL:  awake, alert, cooperative, no apparent distress, and appears stated age  NECK:  Supple, symmetrical, trachea midline, no adenopathy, thyroid symmetric, not enlarged and no tenderness, skin normal  LUNGS:  no increased work of breathing, good air exchange and clear to auscultation  CARDIOVASCULAR:  regular rate and rhythm and pulses 2 plus all extermities bilaterally  ABDOMEN:  soft, non-distended and non-tender    LABS:    Lab Results Component Value Date    WBC 7.0 10/20/2020    HGB 12.8 10/20/2020    HCT 40.4 10/20/2020     10/20/2020    PROTIME 12.8 (H) 10/20/2020    INR 1.1 10/20/2020    APTT 28.8 12/19/2013    K 4.3 10/20/2020    BUN 25 (H) 10/20/2020    CREATININE 1.0 10/20/2020       RADIOLOGY:

## 2020-10-22 NOTE — OP NOTE
Operative Note      Patient: Nae Barajas  YOB: 1934  MRN: [id number]      DATE OF PROCEDURE: 10/22/2020     SURGEON: Romayne Spence M.D.     ASSISTANT: Dario Cheng     PREOPERATIVE DIAGNOSIS: Asymptomatic 99% left internal carotid artery stenosis. POSTOPERATIVE DIAGNOSIS: Same    OPERATION: Left carotid endarterectomy with bovine patch angioplasty. ANESTHESIA: General endotracheal anesthesia     ESTIMATED BLOOD LOSS: less than 853 ml     COMPLICATIONS: None     DESCRIPTION OF PROCEDURE: The patient was identified and the procedure was confirmed. The left neck was prepped and draped in the usual sterile fashion. A skin incision was made along the anterior border of the sternocleidomastoid muscle and carried down through the subcutaneous tissue. Dissection continued through the platysma and along the anterior border of the sternocleidomastoid muscle. The common facial vein was divided between silk ties. The common carotid artery was identified and dissected free from the surrounding tissues. It was surrounded proximally in the soft portion with an umbilical tape. The vagus nerve was deep to the artery and preserved. Dissection continued along the carotid artery and the external carotid artery and its superior thyroid branch were dissected free from the surrounding tissues and surrounded with vessel loops for control. The vagus was on the medial aspect of the common carotid extending cephalad. Mobilization of the vagus nerve head needed to be performed. It was also densely adherent to the anterior wall of the common carotid. the patient was then heparinized, maintaining an activated clotting time greater than 300 seconds. Dissection continued along the internal carotid artery, which was freed from the surrounding tissues and surrounded with a vessel loop for control. The hypoglossal nerve was identified and preserved.     The vessel loops on the external carotid artery branches were controlled and the internal carotid artery was clamped. Back pressures were performed. The pressure was reading between 93 and 97 mmHg in the internal carotid vessel. At this point a #11 blade was used to make an arteriotomy extended with Viveros scissors. There was a densely adherent plaque. The plaque was also long extending into the common carotid and into the internal carotid vessel. A standard endarterectomy was then performed of the obtaining smooth end points on the distal common and internal carotid arteries. Loose fibrinous debris was removed from the vessel bed, which was flushed with heparinized saline solution. 7.0 tacking sutures were placed at distal endpoint. A bovine patch was obtained and cut to the appropriate length and sewn to the artery using a running 6-0 Prolene suture. Prior to completing the patch closure. Everything was backbled and forward flushed. There was excellent bleeding from the internal carotid vessel. Copious irrigation was performed followed by flushing and flushing the vessel again. The suture line was completed. Flow was then reestablished into the external carotid vessel. Followed by the internal carotid vessel. A hand-held Doppler was used demonstrating good flow in all 3 vessels. Hemostatic agent/Rashmi and snow was placed combined with thrombin Gelfoam and pressure was held. The patient was given protamine and hemostasis was obtained. The incision was irrigated with antibiotic saline solution. The platysma was approximated with interrupted 3-0 Vicryl sutures and 0.25% Marcaine was injected into the subcutaneous tissue surrounding the incision. The skin was closed with a subcuticular Vicryl stitch and Dermabond was applied to the skin incision in the operating room. Needle, sponge, and instrument counts were reported as correct x2.  The patient tolerated the procedure and was transferred to the Cardiovascular ICU in satisfactory condition. Padmini Gore    CC : Vinh Wilhelm MD    Specimens:   ID Type Source Tests Collected by Time Destination   A : left carotid plaque Tissue Tissue SURGICAL PATHOLOGY Padmini Gore MD 10/22/2020 6475        Implants:  Implant Name Type Inv. Item Serial No.  Lot No. LRB No. Used Action   JUAN CARLOS-PATCH GRAFT VASC . [id number] . 045MM XENOSURE LF Vascular/Graft/Patch/Filter JUAN CARLOS-PATCH GRAFT VASC . [id number] . 045MM XENOSURE LF  [employer] [id number] Left 1 Implanted         Drains:   Urethral Catheter Non-latex 16 fr (Active)   $ Urethral catheter insertion $ Not inserted for procedure 10/22/20 1039   Urine Color Yellow 10/22/20 1039   Urine Appearance Clear 10/22/20 1039       [REMOVED] Closed/Suction Drain Medial Abdomen Accordion 8 Amharic (Removed)       [REMOVED] Urethral Catheter 16 fr (Removed)         Electronically signed by Padmini Gore MD on 10/22/2020 at 11:02 AM

## 2020-10-22 NOTE — ANESTHESIA POSTPROCEDURE EVALUATION
Department of Anesthesiology  Postprocedure Note    Patient: Mariya Lopez  MRN: [id number]  YOB: 1934  Date of evaluation: 10/22/2020  Time:  1:08 PM     Procedure Summary     Date:  10/22/20 Room / Location:  [facility] 03 / [facility]    Anesthesia Start:  9197 Anesthesia Stop:  8297    Procedure:  LEFT CAROTID ENDARTERECTOMY (Left ) Diagnosis:  (CAROTID STENOSIS)    Surgeon:  Erasto Albrecht MD Responsible Provider:  Manuela Alvarenga MD    Anesthesia Type:  general ASA Status:  4          Anesthesia Type: general    Dion Phase I: Dion Score: 9    Dion Phase II:      Last vitals: Reviewed and per EMR flowsheets.        Anesthesia Post Evaluation    Patient location during evaluation: PACU  Patient participation: complete - patient participated  Level of consciousness: awake  Airway patency: patent  Nausea & Vomiting: no vomiting and no nausea  Complications: no  Cardiovascular status: hemodynamically stable  Respiratory status: acceptable  Hydration status: stable

## 2020-10-22 NOTE — ANESTHESIA PRE PROCEDURE
Department of Anesthesiology  Preprocedure Note       Name:  Kings Berg   Age:  80 y.o.  :  1934                                          MRN:  [id number]         Date:  10/22/2020      Surgeon: Cyndi Wilson):  Bereket Alexander MD    Procedure: Procedure(s):  LEFT CAROTID ENDARTERECTOMY    Medications prior to admission:   Prior to Admission medications    Medication Sig Start Date End Date Taking? Authorizing Provider   amLODIPine (NORVASC) 5 MG tablet Take 5 mg by mouth daily   Yes Historical Provider, MD   oxybutynin (DITROPAN-XL) 5 MG extended release tablet Take 5 mg by mouth daily   Yes Historical Provider, MD   vitamin B-6 (PYRIDOXINE) 50 MG tablet Take 50 mg by mouth daily   Yes Historical Provider, MD   Cholecalciferol (VITAMIN D3) 50 MCG (2000) CAPS Take 2,000 Units by mouth daily   Yes Historical Provider, MD   LORazepam (ATIVAN) 1 MG tablet Take 1 mg by mouth nightly. Yes Historical Provider, MD   metoprolol succinate (TOPROL XL) 50 MG extended release tablet Take 50 mg by mouth daily   Yes Historical Provider, MD   atorvastatin (LIPITOR) 20 MG tablet Take 20 mg by mouth daily   Yes Historical Provider, MD   aspirin 81 MG EC tablet Take 81 mg by mouth daily   Yes Historical Provider, MD   lisinopril (PRINIVIL;ZESTRIL) 5 MG tablet Take 5 mg by mouth daily   Yes Historical Provider, MD   Timolol Maleate 0.5 % (DAILY) SOLN Apply 1 drop to eye daily. Both eyes   Yes Historical Provider, MD   clopidogrel (PLAVIX) 75 MG tablet Take 75 mg by mouth daily    Historical Provider, MD   citalopram (CELEXA) 10 MG tablet Take 1 tablet by mouth daily for 30 days. 12/18/13 10/19/20  Edgar Vang MD   tamsulosin (FLOMAX) 0.4 MG capsule Take 1 capsule by mouth nightly.  13   Juve Barrera MD       Current medications:    Current Facility-Administered Medications   Medication Dose Route Frequency Provider Last Rate Last Dose    sodium chloride flush 0.9 % injection 10 mL  10 mL Intravenous 2 times per day Godwin Pretty MD        sodium chloride flush 0.9 % injection 10 mL  10 mL Intravenous PRN Godwin Pretty MD        ceFAZolin (ANCEF) 2 g in sterile water 20 mL IV syringe  2 g Intravenous On Call to [address], MD        0.9 % sodium chloride infusion   Intravenous Continuous Godwin Pretty  mL/hr at 10/22/20 0903         Allergies:  No Known Allergies    Problem List:    Patient Active Problem List   Diagnosis Code    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, 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.9    Anemia, iron deficiency D50.9    Hypertension I10    Enlarged prostate with lower urinary tract symptoms (LUTS) N40.1    Gastric adenocarcinoma (HCC) C16.9    Mediastinal adenopathy R59.0    Lung nodule seen on imaging study R91.1    Mitral regurgitation and aortic stenosis I08.0    Intra-abdominal abscess post-procedure T81.43XA    Protein-calorie malnutrition, severe ([address] E43    Pneumonia J18.9    Small bowel anastomotic leak K91.89    Diarrhea R19.7    Hyponatremia E87.1    Abdominal aortic aneurysm (AAA) without rupture ([address] I71.4    Right carotid bruit R09.89    Peripheral vascular disease (HCC) I73.9    Bilateral carotid artery stenosis I65.23       Past Medical History:        Diagnosis Date    Anemia     Arthritis     generalized    Cancer ([address]     Chronic kidney disease     overactive bladder    Duodenal ulcer     required surgery    [place] (hard of hearing)     wears aides    Hypertension        Past Surgical History:        Procedure Laterality Date    ABDOMEN SURGERY      for duodenal ulcer    CARDIAC CATHETERIZATION      CATARACT REMOVAL WITH IMPLANT  12 05 2011    left    CATARACT REMOVAL WITH IMPLANT  1/9/2012    right     ECHO COMPL W DOP COLOR FLOW  12/7/2013 EF 45-50%    suggestive of diastolic dysfunction    HERNIA REPAIR      VASCULAR SURGERY      bilat varicose vein surgery October 2013       Social History:    Social History     Tobacco Use    Smoking status: Former Smoker    Smokeless tobacco: Never Used   Substance Use Topics    Alcohol use: Yes     Comment: beer/wine daily                                Counseling given: Not Answered      Vital Signs (Current):   Vitals:    10/22/20 0618   BP: (!) 148/68   Pulse: 84   Resp: 14   Temp: 36.8 °C (98.2 °F)   TempSrc: Temporal   SpO2: 92%   Weight: 170 lb (77.1 kg)   Height: 5' 8\" (1.727 m)                                              BP Readings from Last 3 Encounters:   10/22/20 (!) 148/68   10/20/20 (!) 117/58   09/02/20 138/74       NPO Status: Time of last liquid consumption: 0400                        Time of last solid consumption: 2230                        Date of last liquid consumption: 10/22/20                        Date of last solid food consumption: 10/21/20    BMI:   Wt Readings from Last 3 Encounters:   10/22/20 170 lb (77.1 kg)   10/20/20 170 lb (77.1 kg)   09/02/20 170 lb (77.1 kg)     Body mass index is 25.85 kg/m². CBC:   Lab Results   Component Value Date    WBC 7.0 10/20/2020    RBC 4.13 10/20/2020    HGB 12.8 10/20/2020    HCT 40.4 10/20/2020    MCV 97.8 10/20/2020    RDW 13.7 10/20/2020     10/20/2020       CMP:   Lab Results   Component Value Date     10/20/2020    K 4.3 10/20/2020    CL 97 10/20/2020    CO2 29 10/20/2020    BUN 25 10/20/2020    CREATININE 1.0 10/20/2020    GFRAA >60 10/20/2020    LABGLOM >60 10/20/2020    GLUCOSE 113 10/20/2020    GLUCOSE 92 10/03/2011    PROT 8.1 04/21/2020    CALCIUM 9.7 10/20/2020    BILITOT 0.5 04/21/2020    ALKPHOS 106 04/21/2020    AST 22 04/21/2020    ALT 16 04/21/2020       POC Tests: No results for input(s): POCGLU, POCNA, POCK, POCCL, POCBUN, POCHEMO, POCHCT in the last 72 hours.     Coags:   Lab Results   Component Value Date    PROTIME 12.8 10/20/2020    INR 1.1 10/20/2020    APTT 28.8 12/19/2013       HCG (If Applicable): No results found for: PREGTESTUR, PREGSERUM, HCG, HCGQUANT     ABGs: No results found for: PHART, PO2ART, XCE0HCW, KOS3OPO, BEART, O3XWTREV     Type & Screen (If Applicable):  No results found for: LABABO, LABRH    Drug/Infectious Status (If Applicable):  No results found for: HIV, HEPCAB    COVID-19 Screening (If Applicable):   Lab Results   Component Value Date    COVID19 Not Detected 10/16/2020     ECHO 9/18/2020   Findings      Left Ventricle   Left ventricle is mildly enlarged . Mild left ventricular concentric hypertrophy noted. Ejection fraction is visually estimated at 51%. Overall ejection fraction is normal .   There is doppler evidence of stage I diastolic dysfunction. Right Ventricle   Normal right ventricle structure and function. Left Atrium   The left atrium is mildly dilated. Interatrial septum appears intact. Right Atrium   Mildly enlarged right atrium size. Mitral Valve   Structurally normal mitral valve. Moderate mitral annular calcification. No evidence of mitral valve stenosis. Mild to moderate mitral regurgitation is present. Tricuspid Valve   The tricuspid valve appears structurally normal.   Physiologic and/or trace tricuspid regurgitation. No evidence of tricuspid stenosis. RVSP is 30 mmHg. Aortic Valve   The aortic valve is trileaflet. There is moderate fibrocalcific sclerosis of the aortic valve with   evidence of moderate aortic stenosis with mean gradient across the aortic   valve of 32 mmHg and calculated aortic valve area was 0.7 square   centimeters, all suggesting moderate aortic stenosis. No evidence of aortic valve regurgitation. Pulmonic Valve   Pulmonic valve is structurally normal. No evidence of any pulmonic   regurgitation. No evidence of pulmonic valve stenosis. There is trace   pulmonary hypertension. Pericardial Effusion   No evidence for hemodynamically significant pericardial effusion. Pleural Effusion   No evidence of pleural effusion. Aorta   Aortic root dimension within normal limits.    The Pulmonary artery is within normal limits. Aortic root is somewhat fibrocalcified. Miscellaneous   Inferior Vena Cava not well visualized. Conclusions      Summary   Left ventricle is mildly enlarged . Mild left ventricular concentric hypertrophy noted. Ejection fraction is visually estimated at 51%. Overall ejection fraction is normal .   There is doppler evidence of stage I diastolic dysfunction. The left atrium is mildly dilated. Mildly enlarged right atrium size. Moderate mitral annular calcification. Mild to moderate mitral regurgitation is present. There is moderate aortic stenosis   with mean gradient across the aortic valve of 32 mmHg. Physiologic and/or trace tricuspid regurgitation. There is trace pulmonary hypertension. Anesthesia Evaluation  Patient summary reviewed no history of anesthetic complications:   Airway: Mallampati: III  TM distance: <3 FB   Neck ROM: limited  Mouth opening: > = 3 FB Dental:    (+) edentulous      Pulmonary:   (+) pneumonia: resolved,  decreased breath sounds,      (-) not a current smoker                           Cardiovascular:    (+) hypertension:, valvular problems/murmurs: AS and MR, hyperlipidemia      ECG reviewed  Rhythm: regular  Rate: normal  Echocardiogram reviewed         Beta Blocker:  Dose within 24 Hrs         Neuro/Psych:   (+) depression/anxiety             GI/Hepatic/Renal:   (+) PUD, renal disease: CRI,          ROS comment: Enlarged prostate with lower urinary tract symptoms. Endo/Other:    (+) blood dyscrasia: anemia and anticoagulation therapy, arthritis:., malignancy/cancer (Gastric adenocarcinoma). Abdominal:           Vascular:   + PVD, aortic or cerebral, . ROS comment: CAROTID STENOSIS      4.4 cm abdominal aortic aneurysm. Anesthesia Plan      general     ASA 4     (18g L hand)  Induction: intravenous.   arterial line and BIS  MIPS: Postoperative opioids intended, Prophylactic antiemetics administered and Postoperative trial extubation. Anesthetic plan and risks discussed with patient. Use of blood products discussed with patient whom consented to blood products. Plan discussed with CRNA and attending. Antonietta Bush RN   10/22/2020        Agree with above assessment. Physical exam unchanged. Spoke to patient about anesthetic plan.   Patient understands and wishes to proceed.  (this addendum was done preop but unable to be filed electronically at that time)

## 2020-10-22 NOTE — PROGRESS NOTES
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 Physical Exam:    /62   Pulse 68   Temp 98.1 °F (36.7 °C)   Resp 22   Ht 5' 8\" (1.727 m)   Wt 170 lb (77.1 kg)   SpO2 97%   BMI 25.85 kg/m²     Recent Labs     10/22/20  1045   WBC 7.5   RBC 3.64*   HGB 11.4*   HCT 35.2*   MCV 96.7   MCH 31.3   MCHC 32.4   RDW 13.4      MPV 9.5     Recent Labs     10/20/20  1040      K 4.3   CL 97*   CO2 29   BUN 25*   CREATININE 1.0   GLUCOSE 113*   CALCIUM 9.7     General Appearance: Arrived to PACU in stable condition  Eyes: PERRL  Pulmonary: CTA bilaterally. No wheezes, no accessory muscle use noted   Cardiovascular: RRR, no heaves or thrills palpated   Telemetry: SR  Abdomen: Soft, nondistended, nontender   Extremities: Palpable pulses all extremities, no edema   Neurologic/Psych: drowsy but following commands, equal in strength bilaterally, tongue midline   Skin: Warm and dry    Incision: left neck incision well approximated no swelling     Assessment/Plan: Day of Surgery     1.   S/p left CEA  - Frequent neurovascular checks, monitor incision for signs of swelling/hematoma   - Continue DAPT  - NPO, IVF @125cc/hr  - Ocasio catheter to GD  - Bedrest  - Ancef  - IV morphine for pain control until taking po       Electronically signed by Reyes Hall, APRN - CNP on 10/22/2020 at 12:06 PM

## 2020-10-23 PROBLEM — I65.22 CAROTID STENOSIS, LEFT: Status: ACTIVE | Noted: 2020-01-01

## 2020-10-23 NOTE — CARE COORDINATION
SW received return call from [address] [place] [address] Drive. [facility] able to accept Pt and will see him tomorrow.    Miguel Angel Srinivasan, L.S.W.  [phone]

## 2020-10-23 NOTE — PROGRESS NOTES
Patient arrived to CVICU. No distress noted. Incision clean, dry, intact. Patient complains of no pain at this time.

## 2020-10-23 NOTE — FLOWSHEET NOTE
Pt discharged home, lines removed, AVS reviewed and all questions answered.  Daughter in law at bedside

## 2020-10-23 NOTE — CARE COORDINATION
[facility] notified ALY that They can not see pt until Tuesday. ALY made referral to [facility] - [facility]. Kw[facility] Flores will let ALY know after she reviews case.    Victor Hugo [place], L.S.W.  [phone]

## 2020-10-23 NOTE — CARE COORDINATION
SW spoke with Pt about Transition Plan of Care. Pt lives with Son & D-I-L with 4 steps with railings to enter the home. Pt uses a cane as needed for stability. PCP: Dr. Davide Meadows. Pharamcy: [facility] on [employer].  ordered [facility]. SW gave Pt a list of [facility]. ALY spoke with D-I-L Amy Medici per Pts request for [facility] choices. Brigida stated any would be ok. Choice 1. [facility] , [facility], [facility]. ALY made referral to [place] [id number]. Keke will look and see. Schedule is tight for Nursing this weekend. Discharge Plan is to return home with family and [facility]. ALY/GRACIE to follow for discharge needs.    Berry Mendez, L.S.W.  [phone]

## 2020-10-23 NOTE — DISCHARGE INSTR - COC
Continuity of Care Form    Patient Name: Mariya Lopez   :  1934  MRN:  [id number]    Admit date:  10/22/2020  Discharge date:  ***    Code Status Order: Full Code   Advance Directives:   Advance Care Flowsheet Documentation     Date/Time Healthcare Directive Type of Healthcare Directive Copy in [address] Agent's Name Healthcare Agent's Phone Number    10/22/20 3141  No, patient does not have an advance directive for healthcare treatment -- -- -- -- --    10/22/20 0620  No, patient does not have an advance directive for healthcare treatment -- -- -- -- --          Admitting Physician:  Erasto Albrecht MD  PCP: Brenton Frazier MD    Discharging Nurse: [facility] Unit/Room#: 3820/3820-A  Discharging Unit Phone Number: ***    Emergency Contact:   Extended Emergency Contact Information  Primary Emergency Contact: Constantin Pennington  Address: [address] [place] [address] [place] [address] Phone: [phone]  Mobile Phone: [phone]  Relation: Child  Secondary Emergency Contact: Vick Garcia  Mobile Phone: [phone]  Relation: Child    Past Surgical History:  Past Surgical History:   Procedure Laterality Date    ABDOMEN SURGERY      for duodenal ulcer    CARDIAC CATHETERIZATION      CAROTID ENDARTERECTOMY Left 10/22/2020    LEFT CAROTID ENDARTERECTOMY performed by Erasto Albrecht MD at [place] [address] WITH IMPLANT  2011    left    CATARACT REMOVAL WITH IMPLANT  2012    right     ECHO COMPL W DOP COLOR FLOW  2013 EF 45-50%    suggestive of diastolic dysfunction    HERNIA REPAIR      VASCULAR SURGERY      bilat varicose vein surgery 2013       Immunization History: There is no immunization history on file for this patient.     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, R41.9    Anemia, iron deficiency D50.9    Hypertension I10    Enlarged prostate with lower urinary tract symptoms (LUTS) N40.1    Gastric adenocarcinoma (HCC) C16.9    Mediastinal adenopathy R59.0    Lung nodule seen on imaging study R91.1    Mitral regurgitation and aortic stenosis I08.0    Intra-abdominal abscess post-procedure T81.43XA    Protein-calorie malnutrition, severe (HCC) E43    Pneumonia J18.9    Small bowel anastomotic leak K91.89    Diarrhea R19.7    Hyponatremia E87.1    Abdominal aortic aneurysm (AAA) without rupture (HCC) I71.4    Right carotid bruit R09.89    Peripheral vascular disease (HCC) I73.9    Stenosis of left carotid artery I65.22    Carotid artery stenosis, asymptomatic, right I65.21    Carotid stenosis, left I65.22       Isolation/Infection:   Isolation          No Isolation        Patient Infection Status     Infection Onset Added Last Indicated Last Indicated By Review Planned Expiration Resolved Resolved By    None active    Resolved    COVID-19 Rule Out 10/16/20 10/16/20 10/16/20 COVID-19 Ambulatory (Ordered)   10/18/20 Rule-Out Test Resulted          Nurse Assessment:  Last Vital Signs: BP (!) 114/51   Pulse 77   Temp 98.4 °F (36.9 °C) (Temporal)   Resp 14   Ht 5' 8\" (1.727 m)   Wt 178 lb (80.7 kg)   SpO2 90%   BMI 27.06 kg/m²     Last documented pain score (0-10 scale): Pain Level: 0  Last Weight:   Wt Readings from Last 1 Encounters:   10/23/20 178 lb (80.7 kg)     Mental Status:  {IP PT MENTAL STATUS:20030}    IV Access:  {[facility] IV ACCESS:579925812}    Nursing Mobility/ADLs:  Walking   {[facility] DME OYNN:277963495}  Transfer  {[facility] DME XAVM:963121051}  Bathing  {[facility] DME WDUS:922391297}  Dressing  {[facility] DME JHDW:006066312}  Toileting  {[facility] DME ZPAT:408836719}  Feeding  {[facility] DME XZAF:336724830}  Med Admin  {[facility] DME KSUZ:535621551}  Med Delivery   {[facility] MED Delivery:555820278}    Wound Care Documentation and Therapy:        Elimination:  Continence:   · Bowel: {YES / BM:87082}  · Bladder: {YES / FC:49755}  Urinary Catheter: {Urinary Catheter:376767657}   Colostomy/Ileostomy/Ileal Conduit: {YES / XD:58244}       Date of Last BM: ***    Intake/Output Summary (Last 24 hours) at 10/23/2020 1326  Last data filed at 10/23/2020 0600  Gross per 24 hour   Intake 3403.67 ml   Output 738 ml   Net 2665.67 ml     I/O last 3 completed shifts: In: 4103.7 [P.O.:770; I.V.:3333.7]  Out: 1438 [QQRO;  Blood:100]    Safety Concerns:     [employer] Safety Concerns:535164572}    Impairments/Disabilities:      [employer] Impairments/Disabilities:157293497}    Nutrition Therapy:  Current Nutrition Therapy:   [employer] Diet List:390683975}    Routes of Feeding: {[facility] DME Other Feedings:134584122}  Liquids: {Slp liquid thickness:27666}  Daily Fluid Restriction: {CHP DME Yes amt example:085413337}  Last Modified Barium Swallow with Video (Video Swallowing Test): {Done Not Done RUDDY:054273582}    Treatments at the Time of Hospital Discharge:   Respiratory Treatments: ***  Oxygen Therapy:  {Therapy; copd oxygen:76444}  Ventilator:    {MH CC Vent SIJN:567256788}    Rehab Therapies: {THERAPEUTIC INTERVENTION:7824186895}  Weight Bearing Status/Restrictions: [facility] Weight Bearin}  Other Medical Equipment (for information only, NOT a DME order):  {EQUIPMENT:301470367}  Other Treatments: ***    Patient's personal belongings (please select all that are sent with patient):  {[facility] DME Belongings:865935227}    RN SIGNATURE:  {Esignature:301351022}    CASE MANAGEMENT/SOCIAL WORK SECTION    Inpatient Status Date: ***    Readmission Risk Assessment Score:  Readmission Risk              Risk of Unplanned Readmission:        15           Discharging to Facility/ Agency   · Name:    · Address:  · Phone:[phone]  · Fax:    Dialysis Facility (if applicable)   · Name:  · Address:  · Dialysis Schedule:  · Phone:  · Fax:    / signature: Electronically signed by ROBE Kumar on 10/23/20 at 1:27 PM EDT    PHYSICIAN SECTION    Prognosis: {Prognosis:0602749161}    Condition at Discharge: Jhoana Perera Patient Condition:661845117}    Rehab Potential (if transferring to Rehab): {Prognosis:8768511085}    Recommended Labs or Other Treatments After Discharge: ***    Physician Certification: I certify the above information and transfer of Sharonda Ram  is necessary for the continuing treatment of the diagnosis listed and that he requires {Admit to Appropriate Level of Care:36495} for {GREATER/LESS:354378555} 30 days.      Update Admission H&P: {[facility]040[facility]1389}    PHYSICIAN SIGNATURE:  {Esignature:816529187}

## 2020-10-23 NOTE — PROGRESS NOTES
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 Intake/Output Summary (Last 24 hours) at 10/23/2020 0823  Last data filed at 10/23/2020 0600  Gross per 24 hour   Intake 4103.67 ml   Output 1438 ml   Net 2665.67 ml       Recent Labs     10/20/20  1040 10/22/20  1045 10/23/20  0557   WBC 7.0 7.5 8.1   HGB 12.8 11.4* 11.3*   HCT 40.4 35.2* 35.2*    171 162      Recent Labs     10/20/20  1040 10/23/20  0557    134   K 4.3 3.8   CL 97* 102   CO2 29 24   BUN 25* 17   CREATININE 1.0 0.6*   GLUCOSE 113* 105*   CALCIUM 9.7 8.3*         Physical Exam:    BP (!) 109/55   Pulse 70   Temp 98.2 °F (36.8 °C) (Temporal)   Resp (!) 32   Ht 5' 8\" (1.727 m)   Wt 178 lb (80.7 kg)   SpO2 93%   BMI 27.06 kg/m²      General: Awake, alert. No complaints   Eyes: PERRL, anicteric   Pulmonary: CTA bilaterally. No wheezes, no accessory muscle use noted on room air   Cardiovascular:  RRR, no heaves or thrills on palpation  Tele: SR  Abdomen: Soft, nontender, +BS   Extremities: Palpable pulses all extremities, no edema   Neurologic/Psych: A&Ox3, GONG to command   Skin: Warm and dry  Incisions: left neck incision well approximated, no swelling, minimal ecchymosis       Assessment/Plan: POD #1     1.   S/p left CEA  - s/e, neurologically intact, no complaints   - Continue DAPT  - Advance diet, ambulate, remove crespo/art line  -discharge home today   -[facility] ordered       Dispo: discharge home today, follow up with Dr Jerry Hernandez in 2 weeks     Electronically signed by LUCILLE Perkins - CNP on 10/23/2020 at 8:23 AM

## 2020-10-23 NOTE — TELEPHONE ENCOUNTER
----- Message from LUCILLE Gonzales CNP sent at 10/23/2020  8:29 AM EDT -----  Please schedule Mr Melony Chaudhry for follow up with Dr Tayler Keith for 1-2 weeks s/p left CEA thanks

## 2020-10-30 NOTE — TELEPHONE ENCOUNTER
Left message asking patient to come in at 8:45 am on 11-11-20 instead of 9:15 am, in order to have ultrasound aorta done (was originally sched on 11-25-20 and needed to be cancelled).

## 2020-11-11 PROBLEM — I65.23 CAROTID STENOSIS, ASYMPTOMATIC, BILATERAL: Status: ACTIVE | Noted: 2020-01-01

## 2020-11-11 NOTE — PROGRESS NOTES
Vascular Surgery Outpatient Progress Note      Chief Complaint   Patient presents with    Post-Op Check     AAA bilateral carotid artery stenosis       HISTORY OF PRESENT ILLNESS:                The patient is a 80 y.o. male who returns for follow-up evaluation of patient status post left carotid endarterectomy. He also has a history of an abdominal aortic aneurysm. Overall he is doing well. He denies any right-sided left-sided weakness numbness or vision changes. He denies any chest pain or shortness of breath. Past Medical History:        Diagnosis Date    Anemia     Arthritis     generalized    Cancer ([address]     Chronic kidney disease     overactive bladder    Duodenal ulcer     required surgery    [place] (hard of hearing)     wears aides    Hypertension      Past Surgical History:        Procedure Laterality Date    ABDOMEN SURGERY      for duodenal ulcer    CARDIAC CATHETERIZATION      CAROTID ENDARTERECTOMY Left 10/22/2020    LEFT CAROTID ENDARTERECTOMY performed by Cheryl Aldana MD at [place] [address] WITH IMPLANT  12 05 2011    left    CATARACT REMOVAL WITH IMPLANT  1/9/2012    right     ECHO COMPL W DOP COLOR FLOW  12/7/2013 EF 45-50%    suggestive of diastolic dysfunction    HERNIA REPAIR      VASCULAR SURGERY      bilat varicose vein surgery October 2013     Current Medications:   Prior to Admission medications    Medication Sig Start Date End Date Taking? Authorizing Provider   amLODIPine (NORVASC) 5 MG tablet Take 5 mg by mouth daily   Yes Historical Provider, MD   oxybutynin (DITROPAN-XL) 5 MG extended release tablet Take 5 mg by mouth daily   Yes Historical Provider, MD   vitamin B-6 (PYRIDOXINE) 50 MG tablet Take 50 mg by mouth daily   Yes Historical Provider, MD   Cholecalciferol (VITAMIN D3) 50 MCG (2000 UT) CAPS Take 2,000 Units by mouth daily   Yes Historical Provider, MD   LORazepam (ATIVAN) 1 MG tablet Take 1 mg by mouth nightly.    Yes Historical Provider, MD metoprolol succinate (TOPROL XL) 50 MG extended release tablet Take 50 mg by mouth daily   Yes Historical Provider, MD   clopidogrel (PLAVIX) 75 MG tablet Take 75 mg by mouth daily   Yes Historical Provider, MD   atorvastatin (LIPITOR) 20 MG tablet Take 20 mg by mouth daily   Yes Historical Provider, MD   aspirin 81 MG EC tablet Take 81 mg by mouth daily   Yes Historical Provider, MD   lisinopril (PRINIVIL;ZESTRIL) 5 MG tablet Take 5 mg by mouth daily   Yes Historical Provider, MD   citalopram (CELEXA) 10 MG tablet Take 1 tablet by mouth daily for 30 days. 12/18/13 11/11/20 Yes Edgar Stinson MD   tamsulosin (FLOMAX) 0.4 MG capsule Take 1 capsule by mouth nightly. 12/18/13  Yes Edgar Stinson MD   Timolol Maleate 0.5 % (DAILY) SOLN Apply 1 drop to eye daily. Both eyes   Yes Historical Provider, MD     Allergies:  Patient has no known allergies.     Social History     Socioeconomic History    Marital status:      Spouse name: Not on file    Number of children: Not on file    Years of education: Not on file    Highest education level: Not on file   Occupational History    Not on file   Social Needs    Financial resource strain: Not on file    Food insecurity     Worry: Not on file     Inability: Not on file    Transportation needs     Medical: Not on file     Non-medical: Not on file   Tobacco Use    Smoking status: Former Smoker    Smokeless tobacco: Never Used   Substance and Sexual Activity    Alcohol use: Yes     Comment: beer/wine daily    Drug use: Not on file    Sexual activity: Not on file   Lifestyle    Physical activity     Days per week: Not on file     Minutes per session: Not on file    Stress: Not on file   Relationships    Social connections     Talks on phone: Not on file     Gets together: Not on file     Attends [employer] service: Not on file     Active member of club or organization: Not on file     Attends meetings of clubs or organizations: Not on file     Relationship

## 2020-11-11 NOTE — PROGRESS NOTES
[facility] - [facility]    This is a pre read worksheet - prior to official physician interpretation    Graciela Liliana  1934  Date of study: 11/11/20    Indication for study:  AAA  Study :  Aortic Ultrasound    Diameter Aorta:     Proximal:   cm     Mid:   cm     Distal:  4.4 x 4.2 cm     Right iliac: 1.2 x 1.3 cm     Left iliac: 1.6 x 1.5 cm    Additional comments:         LAST STUDY  5/15/2020 CTA  4.4 cm

## (undated) DEVICE — SET SURG INSTR ART III

## (undated) DEVICE — CAROTID ARTERY SHUNT KIT,RADIOPAQUE LINE, STRAIGHT: Brand: ARGYLE

## (undated) DEVICE — CATHETER ETER IV 20GA L1IN POLYUR STR RADPQ INTROCAN SFTY

## (undated) DEVICE — GARMENT,MEDLINE,DVT,INT,CALF,MED, GEN2: Brand: MEDLINE

## (undated) DEVICE — 72" ARTERIAL PRESSURE TUBING: Brand: ICU MEDICAL

## (undated) DEVICE — PATIENT RETURN ELECTRODE, SINGLE-USE, CONTACT QUALITY MONITORING, ADULT, WITH 9FT CORD, FOR PATIENTS WEIGING OVER 33LBS. (15KG): Brand: MEGADYNE

## (undated) DEVICE — MAGNETIC INSTR DRAPE 20X16: Brand: MEDLINE INDUSTRIES, INC.

## (undated) DEVICE — PACK,LAPAROTOMY,NO GOWNS: Brand: MEDLINE

## (undated) DEVICE — TOWEL,OR,DSP,ST,BLUE,STD,6/PK,12PK/CS: Brand: MEDLINE

## (undated) DEVICE — LOOP VES W25MM THK1MM MAXI RED SIL FLD REPELLENT 100 PER

## (undated) DEVICE — DOUBLE BASIN SET: Brand: MEDLINE INDUSTRIES, INC.

## (undated) DEVICE — 24" (61 CM) ARTERIAL PRESSURE TUBING: Brand: ICU MEDICAL

## (undated) DEVICE — NEEDLE HYPO 25GA L0.625IN BLU POLYPR HUB S STL REG BVL STR

## (undated) DEVICE — Z INACTIVE USE 2535480 CLIP LIG M BLU TI HRT SHP WIRE HORZ 180 PER BX

## (undated) DEVICE — AGENT HEMSTAT 5GM ARISTA AH

## (undated) DEVICE — GOWN,SIRUS,FABRNF,XL,20/CS: Brand: MEDLINE

## (undated) DEVICE — BLADE CLIPPER GEN PURP NS

## (undated) DEVICE — Z DUP USE 2257490 ADHESIVE SKIN CLSRE 036ML TPCL 2CTL CNCRLTE HIGH VSCSTY DRMB

## (undated) DEVICE — LABEL MED 4 IN SURG PANEL W/ PEN STRL

## (undated) DEVICE — SURGICAL PROCEDURE PACK VASC MAJ CUST

## (undated) DEVICE — NEEDLE HYPO 26GA L0.625IN TAN POLYPR HUB S STL REG BVL STR

## (undated) DEVICE — TOTAL TRAY, 16FR 10ML SIL FOLEY, URN: Brand: MEDLINE

## (undated) DEVICE — NEEDLE HYPO 18GA L1.5IN PNK POLYPR HUB S STL REG BVL STR

## (undated) DEVICE — STAPLER SKIN L39MM DIA0.53MM CRWN 5.7MM S STL FIX HD PROX

## (undated) DEVICE — GOWN,SIRUS,FABRNF,L,20/CS: Brand: MEDLINE

## (undated) DEVICE — AGENT HEMSTAT W2XL4IN OXIDIZED REGENERATED CELOS ABSRB

## (undated) DEVICE — GAUZE,SPONGE,4"X4",16PLY,XRAY,STRL,LF: Brand: MEDLINE

## (undated) DEVICE — CLIP INT SM TI EZ LD LIG SYS WECK HORZ

## (undated) DEVICE — NEEDLE HYPO 21GA L1.5IN GRN POLYPR HUB S STL REG BVL STR

## (undated) DEVICE — GLOVE SURG SZ 75 L12IN FNGR THK94MIL TRNSLUC YEL LTX

## (undated) DEVICE — TOWEL,OR,DSP,ST,WHITE,DLX,4/PK,20PK/CS: Brand: MEDLINE

## (undated) DEVICE — PENCIL,CAUTERY,ROCKER,PTFE,15'CORD: Brand: MEDLINE INDUSTRIES, INC.

## (undated) DEVICE — CATHETER URETH 22FR L16IN LTX INTMIT ROB MOD BARDX

## (undated) DEVICE — SET INSTR ART 1

## (undated) DEVICE — SOLUTION IV IRRIG 500ML 0.9% SODIUM CHL 2F7123

## (undated) DEVICE — SYRINGE MED 3ML CLR PLAS STD N CTRL LUERLOCK TIP DISP

## (undated) DEVICE — CLOTH SURG PREP PREOPERATIVE CHLORHEXIDINE GLUC 2% READYPREP